# Patient Record
Sex: MALE | Race: WHITE | NOT HISPANIC OR LATINO | Employment: FULL TIME | URBAN - METROPOLITAN AREA
[De-identification: names, ages, dates, MRNs, and addresses within clinical notes are randomized per-mention and may not be internally consistent; named-entity substitution may affect disease eponyms.]

---

## 2018-07-16 ENCOUNTER — OFFICE VISIT (OUTPATIENT)
Dept: URGENT CARE | Facility: CLINIC | Age: 28
End: 2018-07-16
Payer: COMMERCIAL

## 2018-07-16 VITALS
HEART RATE: 81 BPM | DIASTOLIC BLOOD PRESSURE: 86 MMHG | OXYGEN SATURATION: 99 % | RESPIRATION RATE: 18 BRPM | SYSTOLIC BLOOD PRESSURE: 129 MMHG

## 2018-07-16 DIAGNOSIS — S05.01XA ABRASION OF RIGHT CONJUNCTIVA, INITIAL ENCOUNTER: Primary | ICD-10-CM

## 2018-07-16 PROCEDURE — 99203 OFFICE O/P NEW LOW 30 MIN: CPT | Performed by: FAMILY MEDICINE

## 2018-07-16 RX ORDER — OFLOXACIN 3 MG/ML
1 SOLUTION/ DROPS OPHTHALMIC 4 TIMES DAILY
Qty: 5 ML | Refills: 0 | Status: SHIPPED | OUTPATIENT
Start: 2018-07-16 | End: 2019-05-03 | Stop reason: ALTCHOICE

## 2018-07-16 NOTE — PROGRESS NOTES
[facility] - Patient Visit Note  Annette Gomez 24 y o  male MRN: [id number]      Assessment / Plan:   Diagnosis ICD-10-CM Associated Orders   1  Abrasion of right conjunctiva, initial encounter S05  01XA ofloxacin (OCUFLOX) 0 3 % ophthalmic solution       Reason For Visit / Chief Complaint  Chief Complaint   Patient presents with    Eye Pain     Pt c/o right eye pain for an hour    [place] Discussion:  Please see patient instructions     HPI:  Annette Gomez is a 24 y o  male Patient           This Patient Presents with a history of having gotten something in his eye office sure while he had been in the attic all day  This happened about an hour ago he then brought that he now has a sensation of foreign body in the right eye  He has no history of eye trauma in the past he has no blurred vision no history of glaucoma  ALLERGIES:  Allergies as of 07/16/2018    (No Known Allergies)       The following portions of the patient's history were reviewed and updated as appropriate: Allergies, current medications, past family history, past medical history, past social history, past surgical history, and the problem list     Historical Information   No past medical history on file  No past surgical history on file  Social History   History   Alcohol use Not on file     History   Drug use: Unknown     History   Smoking Status    Not on file   Smokeless Tobacco    Not on file     No family history on file            MEDS:    Current Outpatient Prescriptions:     ofloxacin (OCUFLOX) 0 3 % ophthalmic solution, Administer 1 drop to both eyes 4 (four) times a day, Disp: 5 mL, Rfl: 0    FACILITY ADMINISTERED MEDS:        REVIEW OF SYSTEMS    GENERAL: NEGATIVE for:  Generalized Fatigue                             Chills                              Fever Myalgias     OPTHALMIC: NEGATIVE for:  Diplopia                            Scotomata                               ENT:  [address] for:  Hearing Difficulty                            Tinnitus                            Vertigo                            Dizziness                            Ear Pain                            Ear Drainage               NOSE NEGATIVE for:  Nasal Congestion                            Nasal Discharge                            Sinus Pain / Pressure               THROAT NEGATIVE for:  Sore Throat / Throat Pain                            Difficulty Swallowing     RESPIRATORY: NEGATIVE for:  Cough                            Wheezing                            Sputum Production                            Sob / Tachypnea                            Hemoptysis     CARDIOVASCULAR: NEGATIVE for:  Chest Pain                             SOB (cardiac Related)                             Dyspnea on Exertion                             Orthopnea                             PND                             Leg Edema                             Palpitations                               Irregularities/rythym                       CURRENT VITALS:   Blood Pressure: 129/86 (07/16/18 1918)  Pulse: 81 (07/16/18 1918)  Respirations: 18 (07/16/18 1918)  SpO2: 99 % (07/16/18 1918)  /86   Pulse 81   Resp 18   SpO2 99%       PHYSICAL EXAM:         General Appearance:    Alert, cooperative, no apparent distress, appears stated age     Oriented x3    Head:    Normocephalic, without obvious abnormality, atraumatic   Eyes:      EOM's intact,      MAKENZIE,        conjunctiva/corneas clear,          fundi not visualized well    Patient was anesthetized then stained with fluorescein a small abrasion of the conjunctiva is noted at approximately 12 o'clock cornea is not involved   Ears:     Normal external ear canals     Tm right side  Normal     Tm left side    Normal       Nose:   Nares normal externally, septum midline,     mucosa normal,     No anterior drainage         Sinuses   with out   tenderness to palpation / percussion     Throat:   Lips, mucosa, and tongue normal       Anterior pharynx   Normal      Posterior pharynx   Normal        No exudate obvious       Neck:   Supple, symmetrical, trachea midline and moveable    Normal thyroid click present    No carotid bruits appreciated        Lymphatics:     Adenopathy in anterior cervical chain  Normal      Adenopathy in posterior cervical chain   Normal     Lungs:     Clear to auscultation bilaterally    No rales    No ronchi    No wheeze     Heart[de-identified]    Regular rate and rhythm, S1 and S2 normal,     No S3, S4, audible    No murmurs, rubs      Extremities:     Extremities grossly normal     atraumatic,     no cyanosis or edema        Skin:     Skin color, texture, turgor normal, no rashes or lesions                         Follow up at primary care in 2 or 3 days, or sooner if needed OR pesent to local Emergency Room if symptoms are worsening  Portions of the record may have been created with voice recognition software   Occasional wrong word or "sound a like" substitutions may have occurred due to the inherent limitations of voice recognition software   Read the chart carefully and recognize, using context, where substitutions have occurred

## 2018-07-16 NOTE — PATIENT INSTRUCTIONS
Patient was given anesthesia with proparacaine then stained with fluorescein  There is a small abrasion of the conjunctiva at approximately 9 o'clock  Please use the ofloxacin drops  You may use the propeericaine drops if pain develops also

## 2019-05-03 ENCOUNTER — OFFICE VISIT (OUTPATIENT)
Dept: FAMILY MEDICINE CLINIC | Facility: CLINIC | Age: 29
End: 2019-05-03
Payer: COMMERCIAL

## 2019-05-03 ENCOUNTER — APPOINTMENT (OUTPATIENT)
Dept: LAB | Facility: MEDICAL CENTER | Age: 29
End: 2019-05-03
Payer: COMMERCIAL

## 2019-05-03 VITALS
SYSTOLIC BLOOD PRESSURE: 124 MMHG | DIASTOLIC BLOOD PRESSURE: 84 MMHG | BODY MASS INDEX: 29.01 KG/M2 | WEIGHT: 195.89 LBS | HEIGHT: 69 IN

## 2019-05-03 DIAGNOSIS — Z00.00 WELL ADULT EXAM: Primary | ICD-10-CM

## 2019-05-03 DIAGNOSIS — Z00.00 ANNUAL PHYSICAL EXAM: ICD-10-CM

## 2019-05-03 DIAGNOSIS — Z76.89 ENCOUNTER FOR ASSESSMENT OF STD EXPOSURE: ICD-10-CM

## 2019-05-03 DIAGNOSIS — Z13.29 SCREENING FOR THYROID DISORDER: ICD-10-CM

## 2019-05-03 DIAGNOSIS — J30.2 OTHER SEASONAL ALLERGIC RHINITIS: ICD-10-CM

## 2019-05-03 DIAGNOSIS — Z13.220 SCREENING FOR HYPERLIPIDEMIA: ICD-10-CM

## 2019-05-03 DIAGNOSIS — Z00.00 WELL ADULT EXAM: ICD-10-CM

## 2019-05-03 LAB
ALBUMIN SERPL BCP-MCNC: 4.5 G/DL (ref 3.5–5)
ALP SERPL-CCNC: 68 U/L (ref 46–116)
ALT SERPL W P-5'-P-CCNC: 53 U/L (ref 12–78)
ANION GAP SERPL CALCULATED.3IONS-SCNC: 3 MMOL/L (ref 4–13)
AST SERPL W P-5'-P-CCNC: 24 U/L (ref 5–45)
BILIRUB SERPL-MCNC: 0.6 MG/DL (ref 0.2–1)
BUN SERPL-MCNC: 18 MG/DL (ref 5–25)
C TRACH DNA SPEC QL NAA+PROBE: NEGATIVE
CALCIUM SERPL-MCNC: 8.8 MG/DL (ref 8.3–10.1)
CHLORIDE SERPL-SCNC: 106 MMOL/L (ref 100–108)
CHOLEST SERPL-MCNC: 172 MG/DL (ref 50–200)
CO2 SERPL-SCNC: 30 MMOL/L (ref 21–32)
CREAT SERPL-MCNC: 1.27 MG/DL (ref 0.6–1.3)
ERYTHROCYTE [DISTWIDTH] IN BLOOD BY AUTOMATED COUNT: 13.1 % (ref 11.6–15.1)
GFR SERPL CREATININE-BSD FRML MDRD: 77 ML/MIN/1.73SQ M
GLUCOSE P FAST SERPL-MCNC: 84 MG/DL (ref 65–99)
HCT VFR BLD AUTO: 47.9 % (ref 36.5–49.3)
HCV AB SER QL: NORMAL
HDLC SERPL-MCNC: 49 MG/DL (ref 40–60)
HGB BLD-MCNC: 15.6 G/DL (ref 12–17)
LDLC SERPL CALC-MCNC: 109 MG/DL (ref 0–100)
MCH RBC QN AUTO: 30.4 PG (ref 26.8–34.3)
MCHC RBC AUTO-ENTMCNC: 32.6 G/DL (ref 31.4–37.4)
MCV RBC AUTO: 93 FL (ref 82–98)
N GONORRHOEA DNA SPEC QL NAA+PROBE: NEGATIVE
PLATELET # BLD AUTO: 272 THOUSANDS/UL (ref 149–390)
PMV BLD AUTO: 10.3 FL (ref 8.9–12.7)
POTASSIUM SERPL-SCNC: 4.1 MMOL/L (ref 3.5–5.3)
PROT SERPL-MCNC: 7.4 G/DL (ref 6.4–8.2)
RBC # BLD AUTO: 5.13 MILLION/UL (ref 3.88–5.62)
SODIUM SERPL-SCNC: 139 MMOL/L (ref 136–145)
TRIGL SERPL-MCNC: 71 MG/DL
TSH SERPL DL<=0.05 MIU/L-ACNC: 2.88 UIU/ML (ref 0.36–3.74)
WBC # BLD AUTO: 4.72 THOUSAND/UL (ref 4.31–10.16)

## 2019-05-03 PROCEDURE — 86803 HEPATITIS C AB TEST: CPT

## 2019-05-03 PROCEDURE — 80061 LIPID PANEL: CPT

## 2019-05-03 PROCEDURE — 87389 HIV-1 AG W/HIV-1&-2 AB AG IA: CPT

## 2019-05-03 PROCEDURE — 87491 CHLMYD TRACH DNA AMP PROBE: CPT

## 2019-05-03 PROCEDURE — 86696 HERPES SIMPLEX TYPE 2 TEST: CPT

## 2019-05-03 PROCEDURE — 99385 PREV VISIT NEW AGE 18-39: CPT | Performed by: PHYSICIAN ASSISTANT

## 2019-05-03 PROCEDURE — 84443 ASSAY THYROID STIM HORMONE: CPT

## 2019-05-03 PROCEDURE — 86695 HERPES SIMPLEX TYPE 1 TEST: CPT

## 2019-05-03 PROCEDURE — 36415 COLL VENOUS BLD VENIPUNCTURE: CPT

## 2019-05-03 PROCEDURE — 85027 COMPLETE CBC AUTOMATED: CPT

## 2019-05-03 PROCEDURE — 80053 COMPREHEN METABOLIC PANEL: CPT

## 2019-05-03 PROCEDURE — 86592 SYPHILIS TEST NON-TREP QUAL: CPT

## 2019-05-03 PROCEDURE — 87591 N.GONORRHOEAE DNA AMP PROB: CPT

## 2019-05-03 RX ORDER — FLUTICASONE PROPIONATE 50 MCG
2 SPRAY, SUSPENSION (ML) NASAL DAILY
Qty: 1 BOTTLE | Refills: 11 | Status: SHIPPED | OUTPATIENT
Start: 2019-05-03 | End: 2020-08-28

## 2019-05-04 LAB
HSV1 IGG SER IA-ACNC: <0.91 INDEX (ref 0–0.9)
HSV2 IGG SER IA-ACNC: <0.91 INDEX (ref 0–0.9)

## 2019-05-05 LAB — RPR SER QL: NORMAL

## 2019-05-06 LAB — HIV 1+2 AB+HIV1 P24 AG SERPL QL IA: NORMAL

## 2020-02-24 ENCOUNTER — OFFICE VISIT (OUTPATIENT)
Dept: URGENT CARE | Facility: CLINIC | Age: 30
End: 2020-02-24
Payer: COMMERCIAL

## 2020-02-24 VITALS
RESPIRATION RATE: 16 BRPM | OXYGEN SATURATION: 98 % | HEIGHT: 69 IN | DIASTOLIC BLOOD PRESSURE: 93 MMHG | WEIGHT: 196.6 LBS | BODY MASS INDEX: 29.12 KG/M2 | SYSTOLIC BLOOD PRESSURE: 136 MMHG | HEART RATE: 82 BPM | TEMPERATURE: 98.1 F

## 2020-02-24 DIAGNOSIS — S00.31XA ABRASION OF NOSE, INITIAL ENCOUNTER: Primary | ICD-10-CM

## 2020-02-24 PROCEDURE — 99213 OFFICE O/P EST LOW 20 MIN: CPT | Performed by: PHYSICIAN ASSISTANT

## 2020-02-24 PROCEDURE — S9088 SERVICES PROVIDED IN URGENT: HCPCS | Performed by: PHYSICIAN ASSISTANT

## 2020-02-24 NOTE — PATIENT INSTRUCTIONS
Apply Bactroban ointment to the nose as prescribed  If you have increased nose bleeds pain or drainage go to the emergency room or ENT for further evaluation  Nosebleed   AMBULATORY CARE:   A nosebleed , or epistaxis, occurs when one or more of the blood vessels in your nose break  You may have dark or bright red blood from one or both nostrils  A nosebleed is most commonly caused by dry air or picking your nose  A direct blow to your nose, irritation from a cold or allergies, or a foreign object can also cause a nosebleed  Seek care immediately if:   · Your nose is still bleeding after 20 minutes, even after you pinch it  · Your nasal packing is soaked with blood  · You have a foul-smelling discharge coming out of your nose  · You feel so weak and dizzy that you have trouble standing up  · You have trouble breathing or talking  Contact your healthcare provider if:   · You have a fever and are vomiting  · You have pain in and around your nose  · Your nasal pack is loose  · You have questions or concerns about your condition or care  First aid:   · Sit up and lean forward  This will help prevent you from swallowing blood  Spit blood and saliva into a bowl  · Apply pressure to your nose  Use 2 fingers to pinch your nose shut for 10 to 15 minutes  This will help stop the bleeding  Breathe through your mouth  · Apply ice  on the bridge of your nose to decrease swelling and bleeding  Use a cold pack or put crushed ice in a plastic bag  Cover it with a towel to protect your skin  · Pack your nose  with a cotton ball, tissue, tampon, or gauze bandage to stop the bleeding  Treatment for a severe nosebleed  may include any of the following if the bleeding does not stop after first aid is done:  · Medicines  may be applied to a small piece of cotton and placed in your nose  Medicine may also be sprayed in or applied directly to your nose   You may need medicine to prevent an infection  If bleeding is severe, medicine may be injected into a blood vessel in your nose  · Cautery  is when a chemical or electric device is used to seal the blood vessels  This may be done to stop bleeding or prevent more bleeding  Local anesthesia may be used  · Nasal packing  is when layers of gauze are placed in your nose to provide pressure and stop the bleeding  Local anesthesia may be used  Nasal packing is usually removed in 2 to 3 days  · Embolization  is a procedure used to stop the bleeding from inside your nose  Medical glue or a small balloon device may be used to clog the blood vessels in your nose  · Surgery  may be needed if your nosebleed returns over and over long-term  An artery may be tied to stop bleeding  Damaged tissue or an abnormal structure in your nose may be repaired  Prevent another nosebleed:   · Keep your nose moist   Put a small amount of petroleum jelly inside your nostrils as needed  Use a saline (saltwater) nasal spray  Do not put anything else inside your nose unless your healthcare provider says it is okay  Do not  use oil-based lubricants if you use oxygen therapy  They may be flammable  · Use a cool mist humidifier to increase air moisture in your home  This will help your nose stay moist      · Do not pick or blow your nose for at least a week  You can irritate or damage your nose if you pick it  Blowing your nose too hard may cause the bleeding to start again  Do not bend over or strain as this can cause the bleeding to start again  · Avoid irritants  such as tobacco smoke or chemical sprays such as   Follow up with your healthcare provider as directed: Any packing in your nose should be removed within 2 to 3 days  Write down your questions so you remember to ask them during your visits  © 2017 [employer] Information is for End User's use only and may not be sold, redistributed or otherwise used for commercial purposes  All illustrations and images included in CareNotes® are the copyrighted property of A D [employer] M [employer]  or Adrian Gutierrez  The above information is an  only  It is not intended as medical advice for individual conditions or treatments  Talk to your doctor, nurse or pharmacist before following any medical regimen to see if it is safe and effective for you

## 2020-02-24 NOTE — PROGRESS NOTES
[facility]        NAME: Kim Woody is a 34 y o  male  : 1990    MRN: [id number]  DATE: 2020  TIME: 5:36 PM    Assessment and Plan   Abrasion of nose, initial encounter [S00 31XA]  1  Abrasion of nose, initial encounter  mupirocin (BACTROBAN) 2 % ointment         Patient Instructions     Apply Bactroban ointment to the nose as prescribed  If you have increased nose bleeds pain or drainage go to the emergency room or ENT for further evaluation  Follow up with PCP in 3-5 days  Proceed to  ER if symptoms worsen  Chief Complaint     Chief Complaint   Patient presents with    Facial Injury     Pt states one wk ago he was picking up OQO and a splinter went up his nose  Right nostril  c/o occasion bleeding from same nostril  History of Present Illness       Patient was moving OQO a week ago when a small 20 go on a long went into his right nostril  Patient states he was able to remove a splinter  He denies any pain but does have intermittent bleeding from his right nares  Patient states that the bleeding is very minimal       Review of Systems   Review of Systems   Constitutional: Negative for chills and fever  HENT: Positive for nosebleeds  Negative for postnasal drip and rhinorrhea  Gastrointestinal: Negative for nausea and vomiting  Musculoskeletal: Negative for myalgias  Neurological: Negative for headaches  Hematological: Negative for adenopathy           Current Medications       Current Outpatient Medications:     fluticasone (FLONASE) 50 mcg/act nasal spray, 2 sprays into each nostril daily, Disp: 1 Bottle, Rfl: 11    mupirocin (BACTROBAN) 2 % ointment, Apply topically 3 (three) times a day, Disp: 22 g, Rfl: 0    Current Allergies     Allergies as of 2020    (No Known Allergies)            The following portions of the patient's history were reviewed and updated as appropriate: allergies, current medications, past family history, past medical history, past social history, past surgical history and problem list      History reviewed  No pertinent past medical history  History reviewed  No pertinent surgical history  Family History   Problem Relation Age of Onset    Heart disease Father     Diabetes Father          Medications have been verified  Objective   /93 (BP Location: Left arm, Patient Position: Sitting, Cuff Size: Adult)   Pulse 82   Temp 98 1 °F (36 7 °C) (Tympanic)   Resp 16   Ht 5' 9" (1 753 m)   Wt 89 2 kg (196 lb 9 6 oz)   SpO2 98%   BMI 29 03 kg/m²        Physical Exam     Physical Exam   Constitutional: He is oriented to person, place, and time  He appears well-developed and well-nourished  HENT:   Head: Normocephalic and atraumatic  Small abrasion with surrounding dry blood on the medial aspect of the right nares  No visible foreign bodies  Neck: Normal range of motion  Cardiovascular: Normal rate  Pulmonary/Chest: Effort normal    Neurological: He is alert and oriented to person, place, and time  Skin: Skin is warm and dry  Psychiatric: He has a normal mood and affect  His behavior is normal    Nursing note and vitals reviewed

## 2020-08-28 ENCOUNTER — APPOINTMENT (OUTPATIENT)
Dept: LAB | Facility: MEDICAL CENTER | Age: 30
End: 2020-08-28
Payer: COMMERCIAL

## 2020-08-28 ENCOUNTER — OFFICE VISIT (OUTPATIENT)
Dept: FAMILY MEDICINE CLINIC | Facility: CLINIC | Age: 30
End: 2020-08-28
Payer: COMMERCIAL

## 2020-08-28 VITALS
SYSTOLIC BLOOD PRESSURE: 120 MMHG | WEIGHT: 190 LBS | DIASTOLIC BLOOD PRESSURE: 74 MMHG | TEMPERATURE: 97.2 F | HEIGHT: 69 IN | HEART RATE: 72 BPM | OXYGEN SATURATION: 97 % | BODY MASS INDEX: 28.14 KG/M2

## 2020-08-28 DIAGNOSIS — Z13.9 SCREENING FOR CONDITION: ICD-10-CM

## 2020-08-28 DIAGNOSIS — Z13.220 SCREENING FOR HYPERLIPIDEMIA: ICD-10-CM

## 2020-08-28 DIAGNOSIS — Z00.00 WELL ADULT EXAM: Primary | ICD-10-CM

## 2020-08-28 DIAGNOSIS — Z00.00 ANNUAL PHYSICAL EXAM: ICD-10-CM

## 2020-08-28 DIAGNOSIS — Z23 NEED FOR INFLUENZA VACCINATION: ICD-10-CM

## 2020-08-28 LAB
ALBUMIN SERPL BCP-MCNC: 4.3 G/DL (ref 3.5–5)
ALP SERPL-CCNC: 73 U/L (ref 46–116)
ALT SERPL W P-5'-P-CCNC: 67 U/L (ref 12–78)
ANION GAP SERPL CALCULATED.3IONS-SCNC: 3 MMOL/L (ref 4–13)
AST SERPL W P-5'-P-CCNC: 33 U/L (ref 5–45)
BILIRUB SERPL-MCNC: 0.54 MG/DL (ref 0.2–1)
BUN SERPL-MCNC: 20 MG/DL (ref 5–25)
CALCIUM SERPL-MCNC: 9.4 MG/DL (ref 8.3–10.1)
CHLORIDE SERPL-SCNC: 108 MMOL/L (ref 100–108)
CHOLEST SERPL-MCNC: 194 MG/DL (ref 50–200)
CO2 SERPL-SCNC: 30 MMOL/L (ref 21–32)
CREAT SERPL-MCNC: 1.12 MG/DL (ref 0.6–1.3)
ERYTHROCYTE [DISTWIDTH] IN BLOOD BY AUTOMATED COUNT: 12.9 % (ref 11.6–15.1)
GFR SERPL CREATININE-BSD FRML MDRD: 90 ML/MIN/1.73SQ M
GLUCOSE P FAST SERPL-MCNC: 93 MG/DL (ref 65–99)
HCT VFR BLD AUTO: 47.9 % (ref 36.5–49.3)
HDLC SERPL-MCNC: 46 MG/DL
HGB BLD-MCNC: 15.6 G/DL (ref 12–17)
LDLC SERPL CALC-MCNC: 136 MG/DL (ref 0–100)
MCH RBC QN AUTO: 30.1 PG (ref 26.8–34.3)
MCHC RBC AUTO-ENTMCNC: 32.6 G/DL (ref 31.4–37.4)
MCV RBC AUTO: 92 FL (ref 82–98)
PLATELET # BLD AUTO: 279 THOUSANDS/UL (ref 149–390)
PMV BLD AUTO: 9.7 FL (ref 8.9–12.7)
POTASSIUM SERPL-SCNC: 4.2 MMOL/L (ref 3.5–5.3)
PROT SERPL-MCNC: 7.8 G/DL (ref 6.4–8.2)
RBC # BLD AUTO: 5.19 MILLION/UL (ref 3.88–5.62)
SODIUM SERPL-SCNC: 141 MMOL/L (ref 136–145)
TRIGL SERPL-MCNC: 62 MG/DL
WBC # BLD AUTO: 5.58 THOUSAND/UL (ref 4.31–10.16)

## 2020-08-28 PROCEDURE — 90686 IIV4 VACC NO PRSV 0.5 ML IM: CPT | Performed by: PHYSICIAN ASSISTANT

## 2020-08-28 PROCEDURE — 80061 LIPID PANEL: CPT

## 2020-08-28 PROCEDURE — 3725F SCREEN DEPRESSION PERFORMED: CPT | Performed by: PHYSICIAN ASSISTANT

## 2020-08-28 PROCEDURE — 90471 IMMUNIZATION ADMIN: CPT | Performed by: PHYSICIAN ASSISTANT

## 2020-08-28 PROCEDURE — 85027 COMPLETE CBC AUTOMATED: CPT

## 2020-08-28 PROCEDURE — 99395 PREV VISIT EST AGE 18-39: CPT | Performed by: PHYSICIAN ASSISTANT

## 2020-08-28 PROCEDURE — 36415 COLL VENOUS BLD VENIPUNCTURE: CPT

## 2020-08-28 PROCEDURE — 80053 COMPREHEN METABOLIC PANEL: CPT

## 2020-08-28 PROCEDURE — 1036F TOBACCO NON-USER: CPT | Performed by: PHYSICIAN ASSISTANT

## 2020-08-28 PROCEDURE — 3008F BODY MASS INDEX DOCD: CPT | Performed by: PHYSICIAN ASSISTANT

## 2020-08-28 NOTE — PATIENT INSTRUCTIONS

## 2020-08-28 NOTE — PROGRESS NOTES
[facility] [address] [facility]    NAME: Ny Zepeda  AGE: 34 y o  SEX: male  : 1990     DATE: 2020     Assessment and Plan:     Problem List Items Addressed This Visit     None      Visit Diagnoses     Well adult exam    -  Primary    Need for influenza vaccination        Relevant Orders    influenza vaccine, quadrivalent, 0 5 mL, preservative-free, for adult and pediatric patients 6 mos+ (AFLURIA, FLUARIX, FLULAVAL, FLUZONE) (Completed)    Screening for hyperlipidemia        Relevant Orders    Lipid Panel with Direct LDL reflex    Screening for condition        Relevant Orders    CBC    Comprehensive metabolic panel    Annual physical exam        BMI 28 0-28 9,adult              Immunizations and preventive care screenings were discussed with patient today  Appropriate education was printed on patient's after visit summary  Counseling:  · Dental Health: discussed importance of regular tooth brushing, flossing, and dental visits  No follow-ups on file  Chief Complaint:     Chief Complaint   Patient presents with    Annual Exam     well visit       History of Present Illness:     Adult Annual Physical   Patient here for a comprehensive physical exam  The patient reports no problems  Diet and Physical Activity  · Diet/Nutrition: well balanced diet  · Exercise: no formal exercise  Depression Screening  PHQ-9 Depression Screening    PHQ-9:    Frequency of the following problems over the past two weeks:       Little interest or pleasure in doing things:  0 - not at all  Feeling down, depressed, or hopeless:  0 - not at all  PHQ-2 Score:  0       General Health  · Sleep: sleeps well  · Hearing: normal - bilateral   · Vision: no vision problems  · Dental: regular dental visits  Review of Systems:     Review of Systems   Past Medical History:     No past medical history on file     Past Surgical History: No past surgical history on file  Social History:        Social History     Socioeconomic History    Marital status: Single     Spouse name: None    Number of children: None    Years of education: None    Highest education level: None   Occupational History    None   Social Needs    Financial resource strain: None    Food insecurity     Worry: None     Inability: None    Transportation needs     Medical: None     Non-medical: None   Tobacco Use    Smoking status: Never Smoker    Smokeless tobacco: Never Used   Substance and Sexual Activity    Alcohol use: Yes     Frequency: 2-4 times a month    Drug use: Not Currently    Sexual activity: None   Lifestyle    Physical activity     Days per week: None     Minutes per session: None    Stress: None   Relationships    Social connections     Talks on phone: None     Gets together: None     Attends Scientologist service: None     Active member of club or organization: None     Attends meetings of clubs or organizations: None     Relationship status: None    Intimate partner violence     Fear of current or ex partner: None     Emotionally abused: None     Physically abused: None     Forced sexual activity: None   Other Topics Concern    None   Social History Narrative    None      Family History:     Family History   Problem Relation Age of Onset    Heart disease Father     Diabetes Father       Current Medications:     No current outpatient medications on file  No current facility-administered medications for this visit  Allergies:     No Known Allergies   Physical Exam:     /74   Pulse 72   Temp (!) 97 2 °F (36 2 °C)   Ht 5' 9" (1 753 m)   Wt 86 2 kg (190 lb)   SpO2 97%   BMI 28 06 kg/m²     Physical Exam     Faustino Ronquillo PA-C   [facility]       BMI Counseling: Body mass index is 28 06 kg/m²   The BMI is above normal  Nutrition recommendations include reducing portion sizes, decreasing overall calorie intake and 3-5 servings of fruits/vegetables daily  Exercise recommendations include exercising 3-5 times per week

## 2021-04-05 DIAGNOSIS — Z23 ENCOUNTER FOR IMMUNIZATION: ICD-10-CM

## 2021-04-12 ENCOUNTER — IMMUNIZATIONS (OUTPATIENT)
Dept: FAMILY MEDICINE CLINIC | Facility: HOSPITAL | Age: 31
End: 2021-04-12

## 2021-04-12 DIAGNOSIS — Z23 ENCOUNTER FOR IMMUNIZATION: Primary | ICD-10-CM

## 2021-04-12 PROCEDURE — 0011A SARS-COV-2 / COVID-19 MRNA VACCINE (MODERNA) 100 MCG: CPT

## 2021-04-12 PROCEDURE — 91301 SARS-COV-2 / COVID-19 MRNA VACCINE (MODERNA) 100 MCG: CPT

## 2021-05-11 ENCOUNTER — IMMUNIZATIONS (OUTPATIENT)
Dept: FAMILY MEDICINE CLINIC | Facility: HOSPITAL | Age: 31
End: 2021-05-11

## 2021-05-11 DIAGNOSIS — Z23 ENCOUNTER FOR IMMUNIZATION: Primary | ICD-10-CM

## 2021-05-11 PROCEDURE — 0012A SARS-COV-2 / COVID-19 MRNA VACCINE (MODERNA) 100 MCG: CPT

## 2021-05-11 PROCEDURE — 91301 SARS-COV-2 / COVID-19 MRNA VACCINE (MODERNA) 100 MCG: CPT

## 2021-07-27 ENCOUNTER — CLINICAL SUPPORT (OUTPATIENT)
Dept: FAMILY MEDICINE CLINIC | Facility: CLINIC | Age: 31
End: 2021-07-27

## 2021-07-27 DIAGNOSIS — Z51.6 DESENSITIZATION TO ALLERGENS: Primary | ICD-10-CM

## 2021-08-03 ENCOUNTER — CLINICAL SUPPORT (OUTPATIENT)
Dept: FAMILY MEDICINE CLINIC | Facility: CLINIC | Age: 31
End: 2021-08-03
Payer: COMMERCIAL

## 2021-08-03 DIAGNOSIS — Z51.6 DESENSITIZATION TO ALLERGENS: Primary | ICD-10-CM

## 2021-08-03 PROCEDURE — 95115 IMMUNOTHERAPY ONE INJECTION: CPT | Performed by: PHYSICIAN ASSISTANT

## 2021-08-10 ENCOUNTER — CLINICAL SUPPORT (OUTPATIENT)
Dept: FAMILY MEDICINE CLINIC | Facility: CLINIC | Age: 31
End: 2021-08-10
Payer: COMMERCIAL

## 2021-08-10 DIAGNOSIS — Z51.6 ALLERGY DESENSITIZATION THERAPY: ICD-10-CM

## 2021-08-10 PROCEDURE — 95115 IMMUNOTHERAPY ONE INJECTION: CPT | Performed by: PHYSICIAN ASSISTANT

## 2021-08-18 ENCOUNTER — CLINICAL SUPPORT (OUTPATIENT)
Dept: FAMILY MEDICINE CLINIC | Facility: CLINIC | Age: 31
End: 2021-08-18
Payer: COMMERCIAL

## 2021-08-18 DIAGNOSIS — Z51.6 ALLERGY DESENSITIZATION THERAPY: Primary | ICD-10-CM

## 2021-08-18 PROCEDURE — 95115 IMMUNOTHERAPY ONE INJECTION: CPT | Performed by: FAMILY MEDICINE

## 2021-08-25 ENCOUNTER — CLINICAL SUPPORT (OUTPATIENT)
Dept: FAMILY MEDICINE CLINIC | Facility: CLINIC | Age: 31
End: 2021-08-25
Payer: COMMERCIAL

## 2021-08-25 DIAGNOSIS — Z29.8 IMMUNOTHERAPY ENCOUNTER: ICD-10-CM

## 2021-08-25 PROCEDURE — 95115 IMMUNOTHERAPY ONE INJECTION: CPT | Performed by: PHYSICIAN ASSISTANT

## 2021-09-02 ENCOUNTER — CLINICAL SUPPORT (OUTPATIENT)
Dept: FAMILY MEDICINE CLINIC | Facility: CLINIC | Age: 31
End: 2021-09-02
Payer: COMMERCIAL

## 2021-09-02 DIAGNOSIS — Z51.6 DESENSITIZATION TO ALLERGENS: ICD-10-CM

## 2021-09-02 PROCEDURE — 95115 IMMUNOTHERAPY ONE INJECTION: CPT | Performed by: PHYSICIAN ASSISTANT

## 2021-09-08 ENCOUNTER — CLINICAL SUPPORT (OUTPATIENT)
Dept: FAMILY MEDICINE CLINIC | Facility: CLINIC | Age: 31
End: 2021-09-08
Payer: COMMERCIAL

## 2021-09-08 DIAGNOSIS — Z51.6 ALLERGY DESENSITIZATION THERAPY: ICD-10-CM

## 2021-09-08 PROCEDURE — 95115 IMMUNOTHERAPY ONE INJECTION: CPT | Performed by: PHYSICIAN ASSISTANT

## 2021-09-15 ENCOUNTER — CLINICAL SUPPORT (OUTPATIENT)
Dept: FAMILY MEDICINE CLINIC | Facility: CLINIC | Age: 31
End: 2021-09-15
Payer: COMMERCIAL

## 2021-09-15 DIAGNOSIS — J30.2 OTHER SEASONAL ALLERGIC RHINITIS: ICD-10-CM

## 2021-09-15 PROCEDURE — 95115 IMMUNOTHERAPY ONE INJECTION: CPT | Performed by: PHYSICIAN ASSISTANT

## 2021-09-22 ENCOUNTER — CLINICAL SUPPORT (OUTPATIENT)
Dept: FAMILY MEDICINE CLINIC | Facility: CLINIC | Age: 31
End: 2021-09-22
Payer: COMMERCIAL

## 2021-09-22 DIAGNOSIS — Z51.6 DESENSITIZATION TO ALLERGENS: Primary | ICD-10-CM

## 2021-09-22 PROCEDURE — 95115 IMMUNOTHERAPY ONE INJECTION: CPT | Performed by: PHYSICIAN ASSISTANT

## 2021-09-29 ENCOUNTER — CLINICAL SUPPORT (OUTPATIENT)
Dept: FAMILY MEDICINE CLINIC | Facility: CLINIC | Age: 31
End: 2021-09-29
Payer: COMMERCIAL

## 2021-09-29 DIAGNOSIS — J30.2 OTHER SEASONAL ALLERGIC RHINITIS: ICD-10-CM

## 2021-09-29 PROCEDURE — 95115 IMMUNOTHERAPY ONE INJECTION: CPT | Performed by: PHYSICIAN ASSISTANT

## 2021-10-06 ENCOUNTER — CLINICAL SUPPORT (OUTPATIENT)
Dept: FAMILY MEDICINE CLINIC | Facility: CLINIC | Age: 31
End: 2021-10-06
Payer: COMMERCIAL

## 2021-10-06 DIAGNOSIS — Z51.6 DESENSITIZATION TO ALLERGENS: Primary | ICD-10-CM

## 2021-10-06 PROCEDURE — 95115 IMMUNOTHERAPY ONE INJECTION: CPT | Performed by: PHYSICIAN ASSISTANT

## 2021-10-13 ENCOUNTER — CLINICAL SUPPORT (OUTPATIENT)
Dept: FAMILY MEDICINE CLINIC | Facility: CLINIC | Age: 31
End: 2021-10-13
Payer: COMMERCIAL

## 2021-10-13 DIAGNOSIS — Z51.6 ALLERGY DESENSITIZATION THERAPY: Primary | ICD-10-CM

## 2021-10-13 PROCEDURE — 95115 IMMUNOTHERAPY ONE INJECTION: CPT | Performed by: INTERNAL MEDICINE

## 2021-10-20 ENCOUNTER — APPOINTMENT (OUTPATIENT)
Dept: LAB | Facility: MEDICAL CENTER | Age: 31
End: 2021-10-20
Payer: COMMERCIAL

## 2021-10-20 ENCOUNTER — OFFICE VISIT (OUTPATIENT)
Dept: FAMILY MEDICINE CLINIC | Facility: CLINIC | Age: 31
End: 2021-10-20
Payer: COMMERCIAL

## 2021-10-20 VITALS
DIASTOLIC BLOOD PRESSURE: 82 MMHG | WEIGHT: 201.8 LBS | TEMPERATURE: 97.3 F | HEIGHT: 69 IN | HEART RATE: 87 BPM | SYSTOLIC BLOOD PRESSURE: 114 MMHG | BODY MASS INDEX: 29.89 KG/M2 | OXYGEN SATURATION: 98 %

## 2021-10-20 DIAGNOSIS — Z13.89 SCREENING FOR MULTIPLE CONDITIONS: ICD-10-CM

## 2021-10-20 DIAGNOSIS — M77.8 RIGHT WRIST TENDONITIS: Primary | ICD-10-CM

## 2021-10-20 LAB
ALBUMIN SERPL BCP-MCNC: 4.1 G/DL (ref 3.5–5)
ALP SERPL-CCNC: 64 U/L (ref 46–116)
ALT SERPL W P-5'-P-CCNC: 51 U/L (ref 12–78)
ANION GAP SERPL CALCULATED.3IONS-SCNC: 3 MMOL/L (ref 4–13)
AST SERPL W P-5'-P-CCNC: 29 U/L (ref 5–45)
BILIRUB SERPL-MCNC: 0.81 MG/DL (ref 0.2–1)
BUN SERPL-MCNC: 17 MG/DL (ref 5–25)
CALCIUM SERPL-MCNC: 9.4 MG/DL (ref 8.3–10.1)
CHLORIDE SERPL-SCNC: 107 MMOL/L (ref 100–108)
CHOLEST SERPL-MCNC: 202 MG/DL (ref 50–200)
CO2 SERPL-SCNC: 28 MMOL/L (ref 21–32)
CREAT SERPL-MCNC: 1.15 MG/DL (ref 0.6–1.3)
ERYTHROCYTE [DISTWIDTH] IN BLOOD BY AUTOMATED COUNT: 12.9 % (ref 11.6–15.1)
GFR SERPL CREATININE-BSD FRML MDRD: 86 ML/MIN/1.73SQ M
GLUCOSE P FAST SERPL-MCNC: 93 MG/DL (ref 65–99)
HCT VFR BLD AUTO: 49.4 % (ref 36.5–49.3)
HDLC SERPL-MCNC: 46 MG/DL
HGB BLD-MCNC: 16.3 G/DL (ref 12–17)
LDLC SERPL CALC-MCNC: 125 MG/DL (ref 0–100)
MCH RBC QN AUTO: 30.3 PG (ref 26.8–34.3)
MCHC RBC AUTO-ENTMCNC: 33 G/DL (ref 31.4–37.4)
MCV RBC AUTO: 92 FL (ref 82–98)
PLATELET # BLD AUTO: 270 THOUSANDS/UL (ref 149–390)
PMV BLD AUTO: 9.9 FL (ref 8.9–12.7)
POTASSIUM SERPL-SCNC: 3.9 MMOL/L (ref 3.5–5.3)
PROT SERPL-MCNC: 7.6 G/DL (ref 6.4–8.2)
RBC # BLD AUTO: 5.38 MILLION/UL (ref 3.88–5.62)
SODIUM SERPL-SCNC: 138 MMOL/L (ref 136–145)
TRIGL SERPL-MCNC: 154 MG/DL
WBC # BLD AUTO: 5.32 THOUSAND/UL (ref 4.31–10.16)

## 2021-10-20 PROCEDURE — 99213 OFFICE O/P EST LOW 20 MIN: CPT | Performed by: PHYSICIAN ASSISTANT

## 2021-10-20 PROCEDURE — 3725F SCREEN DEPRESSION PERFORMED: CPT | Performed by: PHYSICIAN ASSISTANT

## 2021-10-20 PROCEDURE — 1036F TOBACCO NON-USER: CPT | Performed by: PHYSICIAN ASSISTANT

## 2021-10-20 PROCEDURE — 80053 COMPREHEN METABOLIC PANEL: CPT

## 2021-10-20 PROCEDURE — 80061 LIPID PANEL: CPT

## 2021-10-20 PROCEDURE — 3008F BODY MASS INDEX DOCD: CPT | Performed by: PHYSICIAN ASSISTANT

## 2021-10-20 PROCEDURE — 36415 COLL VENOUS BLD VENIPUNCTURE: CPT

## 2021-10-20 PROCEDURE — 85027 COMPLETE CBC AUTOMATED: CPT

## 2021-11-22 ENCOUNTER — CLINICAL SUPPORT (OUTPATIENT)
Dept: FAMILY MEDICINE CLINIC | Facility: CLINIC | Age: 31
End: 2021-11-22
Payer: COMMERCIAL

## 2021-11-22 DIAGNOSIS — Z51.6 NEED FOR DESENSITIZATION TO ALLERGENS: ICD-10-CM

## 2021-11-22 PROCEDURE — 95115 IMMUNOTHERAPY ONE INJECTION: CPT | Performed by: PHYSICIAN ASSISTANT

## 2021-11-30 ENCOUNTER — CLINICAL SUPPORT (OUTPATIENT)
Dept: FAMILY MEDICINE CLINIC | Facility: CLINIC | Age: 31
End: 2021-11-30
Payer: COMMERCIAL

## 2021-11-30 DIAGNOSIS — Z51.6 NEED FOR DESENSITIZATION TO ALLERGENS: Primary | ICD-10-CM

## 2021-11-30 PROCEDURE — 95115 IMMUNOTHERAPY ONE INJECTION: CPT | Performed by: FAMILY MEDICINE

## 2021-12-07 ENCOUNTER — CLINICAL SUPPORT (OUTPATIENT)
Dept: FAMILY MEDICINE CLINIC | Facility: CLINIC | Age: 31
End: 2021-12-07
Payer: COMMERCIAL

## 2021-12-07 DIAGNOSIS — Z51.6 NEED FOR DESENSITIZATION TO ALLERGENS: Primary | ICD-10-CM

## 2021-12-07 PROCEDURE — 95115 IMMUNOTHERAPY ONE INJECTION: CPT | Performed by: FAMILY MEDICINE

## 2021-12-14 ENCOUNTER — CLINICAL SUPPORT (OUTPATIENT)
Dept: FAMILY MEDICINE CLINIC | Facility: CLINIC | Age: 31
End: 2021-12-14
Payer: COMMERCIAL

## 2021-12-14 DIAGNOSIS — Z51.6 NEED FOR DESENSITIZATION TO ALLERGENS: Primary | ICD-10-CM

## 2021-12-14 PROCEDURE — 95115 IMMUNOTHERAPY ONE INJECTION: CPT | Performed by: PHYSICIAN ASSISTANT

## 2021-12-21 ENCOUNTER — CLINICAL SUPPORT (OUTPATIENT)
Dept: FAMILY MEDICINE CLINIC | Facility: CLINIC | Age: 31
End: 2021-12-21
Payer: COMMERCIAL

## 2021-12-21 DIAGNOSIS — Z51.6 NEED FOR DESENSITIZATION TO ALLERGENS: Primary | ICD-10-CM

## 2021-12-21 PROCEDURE — 95115 IMMUNOTHERAPY ONE INJECTION: CPT | Performed by: PHYSICIAN ASSISTANT

## 2021-12-28 ENCOUNTER — CLINICAL SUPPORT (OUTPATIENT)
Dept: FAMILY MEDICINE CLINIC | Facility: CLINIC | Age: 31
End: 2021-12-28
Payer: COMMERCIAL

## 2021-12-28 DIAGNOSIS — Z51.6 NEED FOR DESENSITIZATION TO ALLERGENS: Primary | ICD-10-CM

## 2021-12-28 PROCEDURE — 95115 IMMUNOTHERAPY ONE INJECTION: CPT | Performed by: FAMILY MEDICINE

## 2022-01-12 ENCOUNTER — CLINICAL SUPPORT (OUTPATIENT)
Dept: FAMILY MEDICINE CLINIC | Facility: CLINIC | Age: 32
End: 2022-01-12
Payer: COMMERCIAL

## 2022-01-12 DIAGNOSIS — Z29.8 IMMUNOTHERAPY ENCOUNTER: ICD-10-CM

## 2022-01-12 PROCEDURE — 95115 IMMUNOTHERAPY ONE INJECTION: CPT | Performed by: PHYSICIAN ASSISTANT

## 2022-01-18 ENCOUNTER — CLINICAL SUPPORT (OUTPATIENT)
Dept: FAMILY MEDICINE CLINIC | Facility: CLINIC | Age: 32
End: 2022-01-18
Payer: COMMERCIAL

## 2022-01-18 DIAGNOSIS — Z51.6 DESENSITIZATION TO ALLERGENS: Primary | ICD-10-CM

## 2022-01-18 PROCEDURE — 95115 IMMUNOTHERAPY ONE INJECTION: CPT | Performed by: PHYSICIAN ASSISTANT

## 2022-01-25 ENCOUNTER — CLINICAL SUPPORT (OUTPATIENT)
Dept: FAMILY MEDICINE CLINIC | Facility: CLINIC | Age: 32
End: 2022-01-25
Payer: COMMERCIAL

## 2022-01-25 DIAGNOSIS — Z51.6 NEED FOR DESENSITIZATION TO ALLERGENS: ICD-10-CM

## 2022-01-25 PROCEDURE — 95115 IMMUNOTHERAPY ONE INJECTION: CPT | Performed by: PHYSICIAN ASSISTANT

## 2022-02-03 ENCOUNTER — CLINICAL SUPPORT (OUTPATIENT)
Dept: FAMILY MEDICINE CLINIC | Facility: CLINIC | Age: 32
End: 2022-02-03
Payer: COMMERCIAL

## 2022-02-03 DIAGNOSIS — Z29.8 IMMUNOTHERAPY ENCOUNTER: ICD-10-CM

## 2022-02-03 PROCEDURE — 95115 IMMUNOTHERAPY ONE INJECTION: CPT | Performed by: PHYSICIAN ASSISTANT

## 2022-02-17 ENCOUNTER — CLINICAL SUPPORT (OUTPATIENT)
Dept: FAMILY MEDICINE CLINIC | Facility: CLINIC | Age: 32
End: 2022-02-17
Payer: COMMERCIAL

## 2022-02-17 DIAGNOSIS — Z51.6 NEED FOR DESENSITIZATION TO ALLERGENS: ICD-10-CM

## 2022-02-17 PROCEDURE — 95115 IMMUNOTHERAPY ONE INJECTION: CPT | Performed by: PHYSICIAN ASSISTANT

## 2022-03-02 ENCOUNTER — CLINICAL SUPPORT (OUTPATIENT)
Dept: FAMILY MEDICINE CLINIC | Facility: CLINIC | Age: 32
End: 2022-03-02
Payer: COMMERCIAL

## 2022-03-02 DIAGNOSIS — J30.89 OTHER ALLERGIC RHINITIS: ICD-10-CM

## 2022-03-02 PROCEDURE — 95115 IMMUNOTHERAPY ONE INJECTION: CPT | Performed by: PHYSICIAN ASSISTANT

## 2022-04-26 ENCOUNTER — OFFICE VISIT (OUTPATIENT)
Dept: FAMILY MEDICINE CLINIC | Facility: CLINIC | Age: 32
End: 2022-04-26
Payer: COMMERCIAL

## 2022-04-26 VITALS
DIASTOLIC BLOOD PRESSURE: 82 MMHG | TEMPERATURE: 97.9 F | BODY MASS INDEX: 30.39 KG/M2 | HEIGHT: 69 IN | SYSTOLIC BLOOD PRESSURE: 124 MMHG | WEIGHT: 205.2 LBS | OXYGEN SATURATION: 97 % | HEART RATE: 88 BPM

## 2022-04-26 DIAGNOSIS — L25.5 RHUS DERMATITIS: Primary | ICD-10-CM

## 2022-04-26 PROCEDURE — 99214 OFFICE O/P EST MOD 30 MIN: CPT | Performed by: PHYSICIAN ASSISTANT

## 2022-04-26 PROCEDURE — 96372 THER/PROPH/DIAG INJ SC/IM: CPT | Performed by: PHYSICIAN ASSISTANT

## 2022-04-26 RX ORDER — TRIAMCINOLONE ACETONIDE 40 MG/ML
80 INJECTION, SUSPENSION INTRA-ARTICULAR; INTRAMUSCULAR ONCE
Status: COMPLETED | OUTPATIENT
Start: 2022-04-26 | End: 2022-04-26

## 2022-04-26 RX ORDER — PREDNISONE 10 MG/1
TABLET ORAL
Qty: 20 TABLET | Refills: 0 | Status: SHIPPED | OUTPATIENT
Start: 2022-04-26

## 2022-04-26 RX ADMIN — TRIAMCINOLONE ACETONIDE 80 MG: 40 INJECTION, SUSPENSION INTRA-ARTICULAR; INTRAMUSCULAR at 13:30

## 2022-04-26 NOTE — PROGRESS NOTES
Assessment/Plan:    Problem List Items Addressed This Visit     None      Visit Diagnoses     Rhus dermatitis    -  Primary    Relevant Medications    predniSONE 10 mg tablet    triamcinolone acetonide (KENALOG-40) 40 mg/mL injection 80 mg (Start on 4/26/2022  1:30 PM)           Diagnoses and all orders for this visit:    Rhus dermatitis  -     predniSONE 10 mg tablet; Days 1 and 2 take 4 tablets daily, days 3 and 4 take 3 tablets daily, days 5 and 6 and 2 tablets daily, days 7 and 8 take 1 tablet daily  -     triamcinolone acetonide (KENALOG-40) 40 mg/mL injection 80 mg        No problem-specific Assessment & Plan notes found for this encounter  Subjective:      Patient ID: Soraida Quinonez is a 36 y o  male  [facility] is here today after developing poison ivy rash on L face, neck, and genitals yesterday  Rash is worse today  The following portions of the patient's history were reviewed and updated as appropriate:   He has a past medical history of Allergic rhinitis  ,  does not have any pertinent problems on file  ,   has no past surgical history on file  ,  family history includes Diabetes in his father; Heart disease in his father  ,   reports that he has never smoked  He has never used smokeless tobacco  He reports current alcohol use  He reports previous drug use ,  has No Known Allergies     Current Outpatient Medications   Medication Sig Dispense Refill    fluticasone (FLONASE) 50 mcg/act nasal spray 2 sprays into each nostril daily 1 Bottle 10    predniSONE 10 mg tablet Days 1 and 2 take 4 tablets daily, days 3 and 4 take 3 tablets daily, days 5 and 6 and 2 tablets daily, days 7 and 8 take 1 tablet daily   20 tablet 0     Current Facility-Administered Medications   Medication Dose Route Frequency Provider Last Rate Last Admin    triamcinolone acetonide (KENALOG-40) 40 mg/mL injection 80 mg  80 mg Intramuscular Once Rosa Cuba PA-C           Review of Systems   Constitutional: Negative for activity change, appetite change, chills, diaphoresis, fatigue, fever and unexpected weight change  HENT: Negative for congestion, ear pain, postnasal drip, rhinorrhea, sinus pressure, sinus pain, sneezing, sore throat, tinnitus and voice change  Eyes: Negative for pain, redness and visual disturbance  Respiratory: Negative for cough, chest tightness, shortness of breath and wheezing  Cardiovascular: Negative for chest pain, palpitations and leg swelling  Gastrointestinal: Negative for abdominal pain, blood in stool, constipation, diarrhea, nausea and vomiting  Genitourinary: Negative for difficulty urinating, dysuria, frequency, hematuria and urgency  Musculoskeletal: Negative for arthralgias, back pain, gait problem, joint swelling, myalgias, neck pain and neck stiffness  Skin: Positive for rash  Negative for color change, pallor and wound  Neurological: Negative for dizziness, tremors, weakness, light-headedness and headaches  Psychiatric/Behavioral: Negative for dysphoric mood, self-injury, sleep disturbance and suicidal ideas  The patient is not nervous/anxious  Objective:  Vitals:    04/26/22 1320   BP: 124/82   Pulse: 88   Temp: 97 9 °F (36 6 °C)   SpO2: 97%   Weight: 93 1 kg (205 lb 3 2 oz)   Height: 5' 9" (1 753 m)     Body mass index is 30 3 kg/m²  Physical Exam  Vitals reviewed  Constitutional:       General: He is not in acute distress  Appearance: He is well-developed  He is not diaphoretic  HENT:      Head: Normocephalic and atraumatic  Right Ear: Hearing, tympanic membrane, ear canal and external ear normal       Left Ear: Hearing, tympanic membrane, ear canal and external ear normal       Mouth/Throat:      Pharynx: Uvula midline  No oropharyngeal exudate  Eyes:      General: No scleral icterus  Right eye: No discharge  Left eye: No discharge  Conjunctiva/sclera: Conjunctivae normal    Neck:      Thyroid: No thyromegaly  Vascular: No carotid bruit  Cardiovascular:      Rate and Rhythm: Normal rate and regular rhythm  Heart sounds: Normal heart sounds  No murmur heard  Pulmonary:      Effort: Pulmonary effort is normal  No respiratory distress  Breath sounds: Normal breath sounds  No wheezing  Abdominal:      General: Bowel sounds are normal  There is no distension  Palpations: Abdomen is soft  There is no mass  Tenderness: There is no abdominal tenderness  There is no guarding or rebound  Musculoskeletal:         General: No tenderness  Normal range of motion  Cervical back: Neck supple  Lymphadenopathy:      Cervical: No cervical adenopathy  Skin:     General: Skin is warm and dry  Findings: Rash (L face/neck with vesicular linear pruritic rash) present  No erythema  Neurological:      Mental Status: He is alert and oriented to person, place, and time  Psychiatric:         Behavior: Behavior normal          Thought Content:  Thought content normal          Judgment: Judgment normal

## 2022-10-18 ENCOUNTER — OFFICE VISIT (OUTPATIENT)
Dept: FAMILY MEDICINE CLINIC | Facility: CLINIC | Age: 32
End: 2022-10-18
Payer: COMMERCIAL

## 2022-10-18 ENCOUNTER — LAB (OUTPATIENT)
Dept: LAB | Facility: MEDICAL CENTER | Age: 32
End: 2022-10-18
Payer: COMMERCIAL

## 2022-10-18 VITALS
BODY MASS INDEX: 29.86 KG/M2 | WEIGHT: 201.6 LBS | TEMPERATURE: 97.2 F | HEART RATE: 78 BPM | OXYGEN SATURATION: 96 % | SYSTOLIC BLOOD PRESSURE: 124 MMHG | HEIGHT: 69 IN | DIASTOLIC BLOOD PRESSURE: 82 MMHG

## 2022-10-18 DIAGNOSIS — Z13.89 SCREENING FOR MULTIPLE CONDITIONS: ICD-10-CM

## 2022-10-18 DIAGNOSIS — Z23 NEED FOR INFLUENZA VACCINATION: ICD-10-CM

## 2022-10-18 DIAGNOSIS — J30.1 ALLERGIC RHINITIS DUE TO POLLEN, UNSPECIFIED SEASONALITY: ICD-10-CM

## 2022-10-18 DIAGNOSIS — J30.89 ALLERGIC RHINITIS DUE TO MOLD: ICD-10-CM

## 2022-10-18 DIAGNOSIS — Z00.00 WELL ADULT EXAM: Primary | ICD-10-CM

## 2022-10-18 LAB
ALBUMIN SERPL BCP-MCNC: 3.5 G/DL (ref 3.5–5)
ALP SERPL-CCNC: 69 U/L (ref 46–116)
ALT SERPL W P-5'-P-CCNC: 42 U/L (ref 12–78)
ANION GAP SERPL CALCULATED.3IONS-SCNC: 3 MMOL/L (ref 4–13)
AST SERPL W P-5'-P-CCNC: 22 U/L (ref 5–45)
BILIRUB SERPL-MCNC: 0.66 MG/DL (ref 0.2–1)
BUN SERPL-MCNC: 11 MG/DL (ref 5–25)
CALCIUM SERPL-MCNC: 9.9 MG/DL (ref 8.3–10.1)
CHLORIDE SERPL-SCNC: 106 MMOL/L (ref 96–108)
CHOLEST SERPL-MCNC: 212 MG/DL
CO2 SERPL-SCNC: 29 MMOL/L (ref 21–32)
CREAT SERPL-MCNC: 1.24 MG/DL (ref 0.6–1.3)
ERYTHROCYTE [DISTWIDTH] IN BLOOD BY AUTOMATED COUNT: 12.9 % (ref 11.6–15.1)
GFR SERPL CREATININE-BSD FRML MDRD: 77 ML/MIN/1.73SQ M
GLUCOSE P FAST SERPL-MCNC: 95 MG/DL (ref 65–99)
HCT VFR BLD AUTO: 49.5 % (ref 36.5–49.3)
HDLC SERPL-MCNC: 50 MG/DL
HGB BLD-MCNC: 15.9 G/DL (ref 12–17)
LDLC SERPL CALC-MCNC: 143 MG/DL (ref 0–100)
MCH RBC QN AUTO: 30.2 PG (ref 26.8–34.3)
MCHC RBC AUTO-ENTMCNC: 32.1 G/DL (ref 31.4–37.4)
MCV RBC AUTO: 94 FL (ref 82–98)
PLATELET # BLD AUTO: 276 THOUSANDS/UL (ref 149–390)
PMV BLD AUTO: 10 FL (ref 8.9–12.7)
POTASSIUM SERPL-SCNC: 4.3 MMOL/L (ref 3.5–5.3)
PROT SERPL-MCNC: 8 G/DL (ref 6.4–8.4)
RBC # BLD AUTO: 5.27 MILLION/UL (ref 3.88–5.62)
SODIUM SERPL-SCNC: 138 MMOL/L (ref 135–147)
TRIGL SERPL-MCNC: 94 MG/DL
TSH SERPL DL<=0.05 MIU/L-ACNC: 3.22 UIU/ML (ref 0.45–4.5)
WBC # BLD AUTO: 5.23 THOUSAND/UL (ref 4.31–10.16)

## 2022-10-18 PROCEDURE — 84443 ASSAY THYROID STIM HORMONE: CPT

## 2022-10-18 PROCEDURE — 90686 IIV4 VACC NO PRSV 0.5 ML IM: CPT | Performed by: PHYSICIAN ASSISTANT

## 2022-10-18 PROCEDURE — 80053 COMPREHEN METABOLIC PANEL: CPT

## 2022-10-18 PROCEDURE — 90471 IMMUNIZATION ADMIN: CPT | Performed by: PHYSICIAN ASSISTANT

## 2022-10-18 PROCEDURE — 36415 COLL VENOUS BLD VENIPUNCTURE: CPT

## 2022-10-18 PROCEDURE — 85027 COMPLETE CBC AUTOMATED: CPT

## 2022-10-18 PROCEDURE — 80061 LIPID PANEL: CPT

## 2022-10-18 PROCEDURE — 99395 PREV VISIT EST AGE 18-39: CPT | Performed by: PHYSICIAN ASSISTANT

## 2022-10-18 RX ORDER — FLUTICASONE PROPIONATE 50 MCG
2 SPRAY, SUSPENSION (ML) NASAL DAILY
Qty: 16 G | Refills: 11 | Status: SHIPPED | OUTPATIENT
Start: 2022-10-18

## 2022-10-18 NOTE — PROGRESS NOTES
[facility]    NAME: Lilli Chavez  AGE: 29 y o  SEX: male  : 1990     DATE: 10/18/2022     Assessment and Plan:     Problem List Items Addressed This Visit    None     Visit Diagnoses     Well adult exam    -  Primary    Need for influenza vaccination        Relevant Orders    influenza vaccine, quadrivalent, 0 5 mL, preservative-free, for adult and pediatric patients 6 mos+ (AFLURIA, FLUARIX, FLULAVAL, FLUZONE) (Completed)    Allergic rhinitis due to pollen, unspecified seasonality        Relevant Medications    fluticasone (FLONASE) 50 mcg/act nasal spray    Allergic rhinitis due to mold        Relevant Medications    fluticasone (FLONASE) 50 mcg/act nasal spray    Screening for multiple conditions        Relevant Orders    CBC    Comprehensive metabolic panel    Lipid Panel with Direct LDL reflex    TSH, 3rd generation with Free T4 reflex          Immunizations and preventive care screenings were discussed with patient today  Appropriate education was printed on patient's after visit summary  Counseling:  Dental Health: discussed importance of regular tooth brushing, flossing, and dental visits  BMI Counseling: Body mass index is 29 77 kg/m²  The BMI is above normal  Nutrition recommendations include decreasing portion sizes, encouraging healthy choices of fruits and vegetables, decreasing fast food intake, consuming healthier snacks, limiting drinks that contain sugar, moderation in carbohydrate intake, increasing intake of lean protein, reducing intake of saturated and trans fat and reducing intake of cholesterol  Exercise recommendations include moderate physical activity 150 minutes/week  Rationale for BMI follow-up plan is due to patient being overweight or obese  Depression Screening and Follow-up Plan: Patient was screened for depression during today's encounter   They screened negative with a PHQ-2 score of 0         Return in 1 year (on 10/18/2023)  Chief Complaint:     Chief Complaint   Patient presents with   • Annual Exam      History of Present Illness:     Adult Annual Physical   Patient here for a comprehensive physical exam  The patient reports problems - 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, very stressful, feels tired but it does not interefere with activities       Diet and Physical Activity  Diet/Nutrition: well balanced diet  Exercise: moderate cardiovascular exercise  Depression Screening  PHQ-2/9 Depression Screening    Little interest or pleasure in doing things: 0 - not at all  Feeling down, depressed, or hopeless: 0 - not at all  PHQ-2 Score: 0  PHQ-2 Interpretation: Negative depression screen       General Health  Sleep: sleeps well  Hearing: normal - bilateral   Vision: goes for regular eye exams  Dental: regular dental visits  Review of Systems:     Review of Systems   Constitutional: Positive for fatigue  Negative for activity change, appetite change, chills, diaphoresis, fever and unexpected weight change  HENT: Negative for congestion, ear pain, postnasal drip, rhinorrhea, sinus pressure, sinus pain, sneezing, sore throat, tinnitus and voice change  Eyes: Negative for pain, redness and visual disturbance  Respiratory: Negative for cough, chest tightness, shortness of breath and wheezing  Cardiovascular: Negative for chest pain, palpitations and leg swelling  Gastrointestinal: Negative for abdominal pain, blood in stool, constipation, diarrhea, nausea and vomiting  Genitourinary: Negative for difficulty urinating, dysuria, frequency, hematuria and urgency  Musculoskeletal: Negative for arthralgias, back pain, gait problem, joint swelling, myalgias, neck pain and neck stiffness  Skin: Negative for color change, pallor, rash and wound  Neurological: Negative for dizziness, tremors, weakness, light-headedness and headaches  Psychiatric/Behavioral: Negative for dysphoric mood, self-injury, sleep disturbance and suicidal ideas  The patient is not nervous/anxious  Past Medical History:     Past Medical History:   Diagnosis Date   • Allergic rhinitis       Past Surgical History:     History reviewed  No pertinent surgical history  Social History:     Social History     Socioeconomic History   • Marital status: Single     Spouse name: None   • Number of children: None   • Years of education: None   • Highest education level: None   Occupational History   • None   Tobacco Use   • Smoking status: Never Smoker   • Smokeless tobacco: Never Used   Vaping Use   • Vaping Use: Never used   Substance and Sexual Activity   • Alcohol use: Yes     Comment: socially   • Drug use: Not Currently   • Sexual activity: None   Other Topics Concern   • None   Social History Narrative    Daily caffeine - 2 cups coffee     Social Determinants of Health     Financial Resource Strain: Not on file   Food Insecurity: Not on file   Transportation Needs: Not on file   Physical Activity: Not on file   Stress: Not on file   Social Connections: Not on file   Intimate Partner Violence: Not on file   Housing Stability: Not on file      Family History:     Family History   Problem Relation Age of Onset   • Heart disease Father    • Diabetes Father       Current Medications:     Current Outpatient Medications   Medication Sig Dispense Refill   • fluticasone (FLONASE) 50 mcg/act nasal spray 2 sprays into each nostril daily 16 g 11     No current facility-administered medications for this visit  Allergies:     No Known Allergies   Physical Exam:     /82   Pulse 78   Temp (!) 97 2 °F (36 2 °C)   Ht 5' 9" (1 753 m)   Wt 91 4 kg (201 lb 9 6 oz)   SpO2 96%   BMI 29 77 kg/m²     Physical Exam  Vitals and nursing note reviewed  Constitutional:       Appearance: He is well-developed  HENT:      Head: Normocephalic and atraumatic     Eyes: Conjunctiva/sclera: Conjunctivae normal    Cardiovascular:      Rate and Rhythm: Normal rate and regular rhythm  Heart sounds: No murmur heard  Pulmonary:      Effort: Pulmonary effort is normal  No respiratory distress  Breath sounds: Normal breath sounds  Abdominal:      Palpations: Abdomen is soft  Tenderness: There is no abdominal tenderness  Musculoskeletal:      Cervical back: Neck supple  Skin:     General: Skin is warm and dry  Neurological:      Mental Status: He is alert            Lety Spencer PA-C   [facility]

## 2022-10-18 NOTE — PATIENT INSTRUCTIONS

## 2022-11-01 DIAGNOSIS — J30.89 ALLERGIC RHINITIS DUE TO MOLD: ICD-10-CM

## 2022-11-01 DIAGNOSIS — J30.1 ALLERGIC RHINITIS DUE TO POLLEN, UNSPECIFIED SEASONALITY: ICD-10-CM

## 2022-11-02 RX ORDER — FLUTICASONE PROPIONATE 50 MCG
SPRAY, SUSPENSION (ML) NASAL
Qty: 48 ML | Refills: 4 | OUTPATIENT
Start: 2022-11-02

## 2023-05-30 ENCOUNTER — OFFICE VISIT (OUTPATIENT)
Dept: URGENT CARE | Facility: CLINIC | Age: 33
End: 2023-05-30

## 2023-05-30 ENCOUNTER — APPOINTMENT (OUTPATIENT)
Dept: RADIOLOGY | Facility: CLINIC | Age: 33
End: 2023-05-30

## 2023-05-30 VITALS
RESPIRATION RATE: 18 BRPM | DIASTOLIC BLOOD PRESSURE: 86 MMHG | SYSTOLIC BLOOD PRESSURE: 125 MMHG | HEART RATE: 84 BPM | OXYGEN SATURATION: 100 % | TEMPERATURE: 98.7 F

## 2023-05-30 DIAGNOSIS — S39.012A LUMBAR STRAIN, INITIAL ENCOUNTER: Primary | ICD-10-CM

## 2023-05-30 DIAGNOSIS — S39.012A LUMBAR STRAIN, INITIAL ENCOUNTER: ICD-10-CM

## 2023-05-30 DIAGNOSIS — M54.50 LUMBAR PAIN: ICD-10-CM

## 2023-05-30 DIAGNOSIS — M54.16 ACUTE RIGHT LUMBAR RADICULOPATHY: ICD-10-CM

## 2023-05-30 RX ORDER — NAPROXEN 500 MG/1
500 TABLET ORAL 2 TIMES DAILY WITH MEALS
Qty: 30 TABLET | Refills: 0 | Status: SHIPPED | OUTPATIENT
Start: 2023-05-30

## 2023-05-30 RX ORDER — PREDNISONE 10 MG/1
TABLET ORAL
Qty: 24 TABLET | Refills: 0 | Status: SHIPPED | OUTPATIENT
Start: 2023-05-30

## 2023-05-30 RX ORDER — LIDOCAINE 50 MG/G
1 PATCH TOPICAL DAILY
Qty: 6 PATCH | Refills: 0 | Status: SHIPPED | OUTPATIENT
Start: 2023-05-30

## 2023-05-30 NOTE — PROGRESS NOTES
"  [facility]        NAME: Kee Hood is a 29 y o  male  : 1990    MRN: [id number]  DATE: May 30, 2023  TIME: 12:54 PM    Assessment and Plan   Lumbar strain, initial encounter [F46 042X]  1  Lumbar strain, initial encounter  XR spine lumbar 2 or 3 views injury    predniSONE 10 mg tablet    lidocaine (Lidoderm) 5 %    naproxen (Naprosyn) 500 mg tablet      2  Lumbar pain  XR spine lumbar 2 or 3 views injury    predniSONE 10 mg tablet    lidocaine (Lidoderm) 5 %    naproxen (Naprosyn) 500 mg tablet      3  Acute right lumbar radiculopathy  predniSONE 10 mg tablet    lidocaine (Lidoderm) 5 %    naproxen (Naprosyn) 500 mg tablet            Patient Instructions       Follow up with PCP in 3-5 days  Proceed to  ER if symptoms worsen  Your xray was preliminarily read by your provider  A radiologist will read the xray and you will be notified if it is abnormal     You have been prescribed prednisone for pain/inflammation  You may take tylenol with this for pain  Apply the lidoderm patches as prescribed  Alternate ice and heat  You are to take the naproxen for pain  You may take tylenol with this  You are NOT to take any other NSAID products with this medication or the prednisone  You are to see your PCP in 3-5 days  Go to the ED if symptoms worsen  Chief Complaint     Chief Complaint   Patient presents with   • Back Pain     C/o lower back pain radiating into right leg onset \"Saturday while doing a deck\"  Denies fall/trauma, reports straining  Hx of sciatic issues as per patient  Last dose advil today 0800         History of Present Illness       This is a 29year old male who states \"always has some sort of back pain and radiculopathy\" and this weekend was hunched over building a deck and now has right lumbar into buttock down right leg pain  Denies N/T  States had taken advil at 8 am today  Denies hx of surgery, incontinence of bowel or bladder           Review of Systems " Review of Systems   Constitutional: Negative  HENT: Negative  Eyes: Negative  Respiratory: Negative  Cardiovascular: Negative  Gastrointestinal: Negative  Endocrine: Negative  Genitourinary: Negative  Musculoskeletal: Positive for back pain  Skin: Negative  Neurological: Negative  Hematological: Negative  Psychiatric/Behavioral: Negative  Current Medications       Current Outpatient Medications:   •  lidocaine (Lidoderm) 5 %, Apply 1 patch topically over 12 hours daily Remove & Discard patch within 12 hours or as directed by MD, Disp: 6 patch, Rfl: 0  •  naproxen (Naprosyn) 500 mg tablet, Take 1 tablet (500 mg total) by mouth 2 (two) times a day with meals, Disp: 30 tablet, Rfl: 0  •  predniSONE 10 mg tablet, Take 5 tabs po x 2 days; 4 tabs po x 2 days; 3 tabs po x 1 day; 2 tabs po x 1 day  1 tab po x 1 day , Disp: 24 tablet, Rfl: 0  •  fluticasone (FLONASE) 50 mcg/act nasal spray, 2 sprays into each nostril daily, Disp: 16 g, Rfl: 11  •  fluticasone (FLONASE) 50 mcg/act nasal spray, 2 sprays into each nostril daily, Disp: 15 8 mL, Rfl: 11    Current Allergies     Allergies as of 05/30/2023   • (No Known Allergies)            The following portions of the patient's history were reviewed and updated as appropriate: allergies, current medications, past family history, past medical history, past social history, past surgical history and problem list      Past Medical History:   Diagnosis Date   • Allergic rhinitis        History reviewed  No pertinent surgical history  Family History   Problem Relation Age of Onset   • Heart disease Father    • Diabetes Father          Medications have been verified  Objective   /86 (BP Location: Left arm)   Pulse 84   Temp 98 7 °F (37 1 °C) (Temporal)   Resp 18   SpO2 100%   No LMP for male patient  Physical Exam     Physical Exam  Vitals and nursing note reviewed     Constitutional:       General: He is not in acute distress  Appearance: Normal appearance  He is normal weight  He is not ill-appearing, toxic-appearing or diaphoretic  HENT:      Head: Normocephalic and atraumatic  Nose: Nose normal       Mouth/Throat:      Mouth: Mucous membranes are moist    Eyes:      Extraocular Movements: Extraocular movements intact  Cardiovascular:      Rate and Rhythm: Normal rate  Pulses: Normal pulses  Pulmonary:      Effort: Pulmonary effort is normal    Musculoskeletal:         General: Signs of injury present  No swelling, tenderness or deformity  Normal range of motion  Cervical back: Normal range of motion  Skin:     General: Skin is warm and dry  Capillary Refill: Capillary refill takes less than 2 seconds  Neurological:      General: No focal deficit present  Mental Status: He is alert and oriented to person, place, and time  Psychiatric:         Mood and Affect: Mood normal          Behavior: Behavior normal          Thought Content:  Thought content normal          Judgment: Judgment normal          Preliminary reading lumbar xray  No acute process seen slight lumbar curvature  Waiting on rad read

## 2023-05-30 NOTE — PATIENT INSTRUCTIONS
Your xray was preliminarily read by your provider  A radiologist will read the xray and you will be notified if it is abnormal     You have been prescribed prednisone for pain/inflammation  You may take tylenol with this for pain  Apply the lidoderm patches as prescribed  Alternate ice and heat  You are to take the naproxen for pain  You may take tylenol with this  You are NOT to take any other NSAID products with this medication or the prednisone  You are to see your PCP in 3-5 days  Go to the ED if symptoms worsen

## 2023-08-22 ENCOUNTER — TELEPHONE (OUTPATIENT)
Dept: FAMILY MEDICINE CLINIC | Facility: CLINIC | Age: 33
End: 2023-08-22

## 2023-08-22 NOTE — TELEPHONE ENCOUNTER
Pt pulled something in back awhile back and was prescribed prednisone and naproxen which helped. He just pulled his back again and asking if you would give him another prescription for Prednisone?

## 2023-08-23 ENCOUNTER — OFFICE VISIT (OUTPATIENT)
Dept: FAMILY MEDICINE CLINIC | Facility: CLINIC | Age: 33
End: 2023-08-23
Payer: COMMERCIAL

## 2023-08-23 VITALS
SYSTOLIC BLOOD PRESSURE: 120 MMHG | TEMPERATURE: 97.6 F | WEIGHT: 205.2 LBS | OXYGEN SATURATION: 98 % | HEIGHT: 69 IN | DIASTOLIC BLOOD PRESSURE: 80 MMHG | BODY MASS INDEX: 30.39 KG/M2 | HEART RATE: 88 BPM

## 2023-08-23 DIAGNOSIS — M54.31 SCIATICA OF RIGHT SIDE: Primary | ICD-10-CM

## 2023-08-23 PROCEDURE — 99214 OFFICE O/P EST MOD 30 MIN: CPT | Performed by: PHYSICIAN ASSISTANT

## 2023-08-23 RX ORDER — NAPROXEN 500 MG/1
500 TABLET ORAL 2 TIMES DAILY WITH MEALS
Qty: 30 TABLET | Refills: 0 | Status: SHIPPED | OUTPATIENT
Start: 2023-08-23

## 2023-08-23 RX ORDER — PREDNISONE 10 MG/1
TABLET ORAL
Qty: 20 TABLET | Refills: 0 | Status: SHIPPED | OUTPATIENT
Start: 2023-08-23

## 2023-08-23 NOTE — ASSESSMENT & PLAN NOTE
Given prednisone taper, recommend take until finished. Pt can also use naproxen 500 mg BID PRN. Recommend heat or ice in 20 minute increments.

## 2023-08-23 NOTE — PROGRESS NOTES
Name: Jesus Chacon      : 1990      MRN: [id number]  Encounter Provider: Connie Alvarado PA-C  Encounter Date: 2023   Encounter department: [address] [place] [address] Road     1. Sciatica of right side  Assessment & Plan:  Given prednisone taper, recommend take until finished. Pt can also use naproxen 500 mg BID PRN. Recommend heat or ice in 20 minute increments. Orders:  -     naproxen (Naprosyn) 500 mg tablet; Take 1 tablet (500 mg total) by mouth 2 (two) times a day with meals  -     predniSONE 10 mg tablet; Days 1 and 2 take 4 tablets daily, days 3 and 4 take 3 tablets daily, days 5 and 6 and 2 tablets daily, days 7 and 8 take 1 tablet daily. BMI Counseling: Body mass index is 30.3 kg/m². The BMI is above normal. Nutrition recommendations include decreasing portion sizes, encouraging healthy choices of fruits and vegetables, decreasing fast food intake, consuming healthier snacks, limiting drinks that contain sugar, moderation in carbohydrate intake, increasing intake of lean protein, reducing intake of saturated and trans fat and reducing intake of cholesterol. Exercise recommendations include moderate physical activity 150 minutes/week. Rationale for BMI follow-up plan is due to patient being overweight or obese. Depression Screening and Follow-up Plan: Patient was screened for depression during today's encounter. They screened negative with a PHQ-2 score of 0. Zhane Hamm is here today after injuring his back. Pt states occurred when he lifted a vacuum. Complains of pain in low R back extending into R leg down as far as his R knee. He states has had similar episodes in the past.    Review of Systems   Constitutional: Negative for activity change, appetite change, chills, diaphoresis, fatigue, fever and unexpected weight change.    HENT: Negative for congestion, ear pain, postnasal drip, rhinorrhea, sinus pressure, sinus pain, sneezing, sore throat, tinnitus and voice change. Eyes: Negative for pain, redness and visual disturbance. Respiratory: Negative for cough, chest tightness, shortness of breath and wheezing. Cardiovascular: Negative for chest pain, palpitations and leg swelling. Gastrointestinal: Negative for abdominal pain, blood in stool, constipation, diarrhea, nausea and vomiting. Genitourinary: Negative for difficulty urinating, dysuria, frequency, hematuria and urgency. Musculoskeletal: Positive for back pain. Negative for arthralgias, gait problem, joint swelling, myalgias, neck pain and neck stiffness. Skin: Negative for color change, pallor, rash and wound. Neurological: Negative for dizziness, tremors, weakness, light-headedness and headaches. Psychiatric/Behavioral: Negative for dysphoric mood, self-injury, sleep disturbance and suicidal ideas. The patient is not nervous/anxious. Current Outpatient Medications on File Prior to Visit   Medication Sig   • fluticasone (FLONASE) 50 mcg/act nasal spray 2 sprays into each nostril daily   • fluticasone (FLONASE) 50 mcg/act nasal spray 2 sprays into each nostril daily   • [DISCONTINUED] lidocaine (Lidoderm) 5 % Apply 1 patch topically over 12 hours daily Remove & Discard patch within 12 hours or as directed by MD (Patient not taking: Reported on 8/23/2023)   • [DISCONTINUED] naproxen (Naprosyn) 500 mg tablet Take 1 tablet (500 mg total) by mouth 2 (two) times a day with meals (Patient not taking: Reported on 8/23/2023)   • [DISCONTINUED] predniSONE 10 mg tablet Take 5 tabs po x 2 days; 4 tabs po x 2 days; 3 tabs po x 1 day; 2 tabs po x 1 day. 1 tab po x 1 day. (Patient not taking: Reported on 8/23/2023)       Objective     /80   Pulse 88   Temp 97.6 °F (36.4 °C)   Ht 5' 9" (1.753 m)   Wt 93.1 kg (205 lb 3.2 oz)   SpO2 98%   BMI 30.30 kg/m²     Physical Exam  Vitals reviewed. Constitutional:       General: He is not in acute distress.      Appearance: He is well-developed. He is not diaphoretic. HENT:      Head: Normocephalic and atraumatic. Right Ear: Hearing, tympanic membrane, ear canal and external ear normal.      Left Ear: Hearing, tympanic membrane, ear canal and external ear normal.      Mouth/Throat:      Pharynx: Uvula midline. No oropharyngeal exudate. Eyes:      General: No scleral icterus. Right eye: No discharge. Left eye: No discharge. Conjunctiva/sclera: Conjunctivae normal.   Neck:      Thyroid: No thyromegaly. Vascular: No carotid bruit. Cardiovascular:      Rate and Rhythm: Normal rate and regular rhythm. Heart sounds: Normal heart sounds. No murmur heard. Pulmonary:      Effort: Pulmonary effort is normal. No respiratory distress. Breath sounds: Normal breath sounds. No wheezing. Abdominal:      General: Bowel sounds are normal. There is no distension. Palpations: Abdomen is soft. There is no mass. Tenderness: There is no abdominal tenderness. There is no guarding or rebound. Musculoskeletal:         General: No tenderness. Normal range of motion. Cervical back: Neck supple. Legs:    Lymphadenopathy:      Cervical: No cervical adenopathy. Skin:     General: Skin is warm and dry. Findings: No erythema or rash. Neurological:      Mental Status: He is alert and oriented to person, place, and time. Psychiatric:         Behavior: Behavior normal.         Thought Content:  Thought content normal.         Judgment: Judgment normal.       Sneha Keane PA-C

## 2023-09-26 ENCOUNTER — TELEPHONE (OUTPATIENT)
Dept: FAMILY MEDICINE CLINIC | Facility: CLINIC | Age: 33
End: 2023-09-26

## 2023-09-26 NOTE — TELEPHONE ENCOUNTER
For what diagnosis? I can refer him to [facility] next door. ----- Message from Li Dunbar sent at 9/25/2023 10:01 AM EDT -----  Regarding: FW: Referral   Contact: [phone]    ----- Message -----  From: Perla Fernando"  Sent: 9/25/2023   9:53 AM EDT  To: [facility]  Subject: Referral                                         Hi, I was wondering if I can have a referral and recommendation for therapy? Aparna Holm been thinking about it for awhile and I think it’s time I’d like to talk to someone. I was going to wait for my yearly physical but I can’t schedule that until the end of Oct. I tried looking around on [employer] marguerite for providers but it’s a little confusing so I figured I start by asking you.  Thank you

## 2023-09-28 DIAGNOSIS — F41.9 ANXIETY: Primary | ICD-10-CM

## 2023-10-13 ENCOUNTER — OFFICE VISIT (OUTPATIENT)
Age: 33
End: 2023-10-13
Payer: COMMERCIAL

## 2023-10-13 DIAGNOSIS — F41.1 GENERALIZED ANXIETY DISORDER: Primary | ICD-10-CM

## 2023-10-13 PROCEDURE — 90791 PSYCH DIAGNOSTIC EVALUATION: CPT

## 2023-10-13 NOTE — BH TREATMENT PLAN
[facility] Integration Treatment Plan    Jose Antonio Espino  1990  Date of Treatment Plan: 10/13/23      Treatment Goals (after each item selected, indicate outcome measures; (ie: as evidenced by)    [] Reduce Risk Factors of:    [x] Reduce Major Symptoms of: anxiety and depression   [] Ameliorate Functional Impairments of:    [] Develop Coping Strategies to Address Stress of:    [] Stabilize (short term) Crisis of:   [x] Maintain (long term) Stabilization of Symptoms of: lack of communication   [x] Medication Referral to: PCP    [] Maintain Sobriety:     Planned Interventions- Patient Participation (must be consistent with treatment goals):    [] Assertiveness Training [] Problem Solving Skills Training   [] Anger Management [] Solution Focused Techniques   [x] Affect Identification and Expression [] Stress Management   [] Cognitive Restructuring [] Supportive Therapy   [x] Communication Training  [] Self/Other Weakley Training    [x] Grief Work  [] Decision Options Exploration   [] Imagery/Relaxation Training [] Decision Option Exploration   [] Parent Training  [] Pattern Identification and Interruption       [] Engage Significant Others in Treatment:   [] Facilitate Decision Making Regarding:   [] Explore/Monitor:    [x] Teach Skills of: coping skills, grounding techniques, breathing exercises   [x] Educate Regarding: non-violent communication, emotion expression   [] Assign Readings:   [] Referrals Planned:   [] Use of Resources/Strengths:   [] Preventative Strategies:   [] Obstacles to Change:     Estimated Time Frames:     Goal 1: 04/13/24   Goal 2: 04/13/24   Goal 3: 04/13/24    I have been provided education on my primary diagnosis of:   1. Generalized anxiety disorder             My therapist and I have developed this plan together, and I am in agreement to working on these issues and goals. I understand the plan that has been developed for my treatment. [x] Patient Declined copy of treatment plan. Copy in [employer].

## 2023-10-13 NOTE — BH CRISIS PLAN
Client Name: Xiang Bliss       Client YOB: 1990  : 1990    Treatment Team (include name and contact information):     Psychotherapist: Nancy Trimble LPC     Psychiatrist: None   Release of information completed: no    : None   Release of information completed: no    Other (Specify Role): None    Release of information completed: no    Other (Specify Role): None   Release of information completed: no    Healthcare Provider  Paty Villarreal PA-C  [address] [facility] [address]  [phone]    Type of Plan   * Child plans (children 15 yo and younger) must be completed and signed by the child's legal guardian   * Plans for all individuals 15 yo and above must be signed by the client. Plan Type: adolescent/adult (15 and over) Initial      My Personal Strengths are (in the client's own words):  "Resourceful"    The stressors and triggers that may put me at risk are:  Passive aggressive and communication difficulities, break up with ex-girlfriend, everyday stressors, financial problems, job loss, job stress, medical illness in family, and ongoing anxiety    Coping skills I can use to keep myself calm and safe:  Physical activity and Other (describe) doing a house project, distracation    Coping skills/supports I can use to maintain abstinence from substance use:   Not Applicable    The people that provide me with help and support: (Include name, contact, and how they can help)   Support person #1: Maitechanning Cathyradhika - friend     * Phone number: in cell phone    * How can they help me? Spend time together, distracts     Support person #2:Sandra Godinez    * Phone number: in cell phone    * How can they help me? Supportive, listen, recommended therapy     Support person #3: Mom -    * Phone number: in cell phone    * How can they help me?  Talk with me    In the past, the following has helped me in times of crisis:    Being in a quiet space, Being with other people, Taking a walk or exercising, and Watching television or a movie      If it is an emergency and you need immediate help, call     If there is a possibility of danger to yourself or others, call the following crisis hotline resources:     Adult Crisis Numbers  Suicide Prevention Hotline - Dial   [facility]: [address]: [address]: [address]: [phone]  [facility]: [phone]  [facility]: [address]: [address]: [phone] (daytime). [phone] (after hours, weekends, holidays)     Child/Adolescent Crisis Numbers   [facility]: [address]: [phone]   [facility]: [phone]   [facility]: [phone]    Please note: Some [facility] do not have a separate number for Child/Adolescent specific crisis. If your county is not listed under Child/Adolescent, please call the adult number for your county     National Talk to Text Line   All Ages - [phone]    In the event your feelings become unmanageable, and you cannot reach your support system, you will call 911 immediately or go to the nearest hospital emergency room.

## 2023-10-13 NOTE — PSYCH
Behavioral Health Psychotherapy Assessment    Date of Initial Psychotherapy Assessment: 10/13/23  Referral Source: Dr. Refugio Leavitt  Has a release of information been signed for the referral source? No    Preferred Name: Johnson Clayton  Preferred Pronouns: He/him  YOB: 1990 Age: 34 y.o. Sex assigned at birth: male   Gender Identity: male  Race:   Preferred Language: English    Emergency Contact:  Full Name: Melissa Devine  Relationship to Client: father   Contact information: [phone]    Primary Care Physician:  Merlene Ponce PA-C  [address] [address]  [phone]  Has a release of information been signed? No    Physical Health History:  Past surgical procedures: none reported  Do you have a history of any of the following: None reported  Do you have any mobility issues? No    Relevant Family History:  Dad - depression, anxiety, dad attempted suicide  Brother- death by suicide, possibly bipolar, treated for ADHD  Reported many family members were being medicated for something, he's just not sure what    Presenting Problem (What brings you in?)  Jose M Nancetorin reported the main concerns were being tired, lots of anxiety and lack of energy and motivation. He shared that his anxiety is presented as worrying, thinking negatively, pacing - walking around not doing anything, difficulty sitting still, difficulty concentrating, racing thoughts, bad habit of not finishing things and difficult controlling thoughts. He reported that his sleep is good, but not feeling rested. "I can do things but then after an hour or so I get wiped out". No significant changes to appetite He reported ADLs as limited difficulties. He finds that he cleans to stop from worrying. "If I sit I get anxiety". He reported having anxiety since high school after brother  by suicide. He didn't notice the worsening anxiousness until a few years after the brother's death.  The worst anxiety started a few years ago during Covid. He stated it was "worst was last fall because of business - filing bankruptcy". He reported current stressors are career change, recent relationship ending, unmanaged history of mental health issues, finances and being involved in a lot. He stated he attended 2 semesters at [employer] [place] and then moved back to go to [employer]. 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 He has a associates degree mechanical engineering. He thinks he will end up going back to school to allow for more opportunities in the job market. He reported that money and time are what's holding him back     Denied suicidal ideation, HI, SIB - "I always look toward the future. I see how it effects others"    "Staying busy helps, when I start sitting it gets bad, more relaxed at night after dinner"    Mental Health Advance Directive:  Do you currently have a Mental Health Advance Directive?yes    Diagnosis:   Diagnosis ICD-10-CM Associated Orders   1.  Generalized anxiety disorder  F41.1           Initial Assessment:     Current Mental Status:    Appearance: appropriate      Behavior/Manner: cooperative      Affect/Mood:  Anxious and depressed    Speech:  Normal    Sleep:  Normal    Oriented to: oriented to self, oriented to place and oriented to time       Clinical Symptoms    Depression: yes      Anxiety: yes      Depression Symptoms: restlessness, serious loss of interest in things, poor concentration and irritable      Anxiety Symptoms: excessive worry, fatigues easily, irritable, nervous/anxious, difficulty controlling worry and restlessness      Have you ever been assaultive to others or the environment: No      Have you ever been self-injurious: No      Counseling History:  Previous Counseling or Treatment  (Mental Health or Drug & Alcohol): Yes    Previous Counseling Details:  [facility] - didn't like virtual and expenisve   Have you previously taken psychiatric medications: No      Suicide Risk Assessment  Have you had incidents of suicidal ideation: No    Are you currently experiencing suicidal thoughts: No      Substance Abuse/Addiction Assessment:  Alcohol: Yes    Frequency:  Monthly  Amount:  1 or 2 drinks - makes me feel more depressed  Heroin: No    Fentanyl: No    Opiates: No    Cocaine: No    Amphetamines: No    Hallucinogens: No    Club Drugs: No    Benzodiazepines: No    Other Rx Meds: No    Marijuana: Yes    Frequency:  Yearly  Method:  Smoke/pipe  Last Use:  Year ago - can't remember last time used  Tobacco/Nicotine: No    Have you experienced blackouts as a result of substance use: No    Have you had any periods of abstinence: No    Have you experienced symptoms of withdrawal: No    Have you ever overdosed on any substances?: No    Are you currently using any Medication Assisted Treatment for Substance Use: No      Disordered Eating History:  Do you have a history of disordered eating: No      Social Determinants of Health:    SDOH:  Financial instability, unemployment/underemployment and stress    Trauma and Abuse History:    Have you ever been abused: No      Legal History:    Have you ever been arrested  or had a DUI: No      Have you been incarcerated: No      Are you currently on parole/probation: No      Any current Children and Youth involvement: No      Any pending legal charges: No      Relationship History:    Current marital status: single      Natural Supports: Mother, father, cousins and friends    Relationship History:   Mother - talked to everyday and still love but there is still some resentment, remarried and not getting , mother left 2/3 years before brother taking life, addicted to opioids, clean and found out a few years ago 2016  No other siblings   Close to dad's side - not as close to mother's side   Dad from [place], [place] from [address] [facility] friend and , working together since 2015  Lives with dad - taking over house financially, working on creating a separate living space     Employment History    Are you currently employed: Yes      Employer/ Job title:   Odd jobs    Sources of income/financial support:  Work     History:      Status: no history of [address] duty  Educational History:     Have you ever been diagnosed with a learning disability: No      Highest level of education:  Associates Degree    Have you ever had an IEP or 504-plan: No      Do you need assistance with reading or writing: No      Recommended Treatment:     Psychotherapy:  Individual sessions    Frequency:  1 time    Session frequency:  Weekly    Visit start and stop times:    10/13/23  Start Time: 1104  Stop Time: 1156  Total Visit Time: 52 minutes

## 2023-10-20 ENCOUNTER — APPOINTMENT (OUTPATIENT)
Dept: LAB | Facility: HOSPITAL | Age: 33
End: 2023-10-20
Payer: COMMERCIAL

## 2023-10-20 ENCOUNTER — OFFICE VISIT (OUTPATIENT)
Dept: FAMILY MEDICINE CLINIC | Facility: CLINIC | Age: 33
End: 2023-10-20

## 2023-10-20 VITALS
WEIGHT: 200 LBS | SYSTOLIC BLOOD PRESSURE: 120 MMHG | BODY MASS INDEX: 29.62 KG/M2 | TEMPERATURE: 97.4 F | HEART RATE: 75 BPM | HEIGHT: 69 IN | DIASTOLIC BLOOD PRESSURE: 76 MMHG | OXYGEN SATURATION: 98 %

## 2023-10-20 DIAGNOSIS — Z13.89 SCREENING FOR MULTIPLE CONDITIONS: ICD-10-CM

## 2023-10-20 DIAGNOSIS — Z23 ENCOUNTER FOR IMMUNIZATION: ICD-10-CM

## 2023-10-20 DIAGNOSIS — Z20.2 POTENTIAL EXPOSURE TO STD: ICD-10-CM

## 2023-10-20 DIAGNOSIS — Z00.00 WELL ADULT EXAM: Primary | ICD-10-CM

## 2023-10-20 DIAGNOSIS — F41.9 ANXIETY AND DEPRESSION: ICD-10-CM

## 2023-10-20 DIAGNOSIS — F32.A ANXIETY AND DEPRESSION: ICD-10-CM

## 2023-10-20 LAB
ALBUMIN SERPL BCP-MCNC: 4.7 G/DL (ref 3.5–5)
ALP SERPL-CCNC: 54 U/L (ref 34–104)
ALT SERPL W P-5'-P-CCNC: 31 U/L (ref 7–52)
ANION GAP SERPL CALCULATED.3IONS-SCNC: 7 MMOL/L
AST SERPL W P-5'-P-CCNC: 24 U/L (ref 13–39)
BILIRUB SERPL-MCNC: 0.64 MG/DL (ref 0.2–1)
BUN SERPL-MCNC: 13 MG/DL (ref 5–25)
CALCIUM SERPL-MCNC: 9.9 MG/DL (ref 8.4–10.2)
CHLORIDE SERPL-SCNC: 103 MMOL/L (ref 96–108)
CHOLEST SERPL-MCNC: 198 MG/DL
CO2 SERPL-SCNC: 29 MMOL/L (ref 21–32)
CREAT SERPL-MCNC: 1.06 MG/DL (ref 0.6–1.3)
ERYTHROCYTE [DISTWIDTH] IN BLOOD BY AUTOMATED COUNT: 12.6 % (ref 11.6–15.1)
GFR SERPL CREATININE-BSD FRML MDRD: 93 ML/MIN/1.73SQ M
GLUCOSE P FAST SERPL-MCNC: 99 MG/DL (ref 65–99)
HAV IGM SER QL: NORMAL
HBV CORE IGM SER QL: NORMAL
HBV SURFACE AG SER QL: NORMAL
HCT VFR BLD AUTO: 48.1 % (ref 36.5–49.3)
HCV AB SER QL: NORMAL
HDLC SERPL-MCNC: 51 MG/DL
HGB BLD-MCNC: 15.7 G/DL (ref 12–17)
HIV 1+2 AB+HIV1 P24 AG SERPL QL IA: NORMAL
HIV 2 AB SERPL QL IA: NORMAL
HIV1 AB SERPL QL IA: NORMAL
HIV1 P24 AG SERPL QL IA: NORMAL
LDLC SERPL CALC-MCNC: 124 MG/DL (ref 0–100)
MCH RBC QN AUTO: 29.8 PG (ref 26.8–34.3)
MCHC RBC AUTO-ENTMCNC: 32.6 G/DL (ref 31.4–37.4)
MCV RBC AUTO: 91 FL (ref 82–98)
PLATELET # BLD AUTO: 254 THOUSANDS/UL (ref 149–390)
PMV BLD AUTO: 9.7 FL (ref 8.9–12.7)
POTASSIUM SERPL-SCNC: 4.1 MMOL/L (ref 3.5–5.3)
PROT SERPL-MCNC: 7.3 G/DL (ref 6.4–8.4)
RBC # BLD AUTO: 5.26 MILLION/UL (ref 3.88–5.62)
SODIUM SERPL-SCNC: 139 MMOL/L (ref 135–147)
TREPONEMA PALLIDUM IGG+IGM AB [PRESENCE] IN SERUM OR PLASMA BY IMMUNOASSAY: NORMAL
TRIGL SERPL-MCNC: 117 MG/DL
TSH SERPL DL<=0.05 MIU/L-ACNC: 2.19 UIU/ML (ref 0.45–4.5)
WBC # BLD AUTO: 4.78 THOUSAND/UL (ref 4.31–10.16)

## 2023-10-20 PROCEDURE — 86695 HERPES SIMPLEX TYPE 1 TEST: CPT

## 2023-10-20 PROCEDURE — 87389 HIV-1 AG W/HIV-1&-2 AB AG IA: CPT

## 2023-10-20 PROCEDURE — 87491 CHLMYD TRACH DNA AMP PROBE: CPT

## 2023-10-20 PROCEDURE — 84443 ASSAY THYROID STIM HORMONE: CPT

## 2023-10-20 PROCEDURE — 85027 COMPLETE CBC AUTOMATED: CPT

## 2023-10-20 PROCEDURE — 80061 LIPID PANEL: CPT

## 2023-10-20 PROCEDURE — 80053 COMPREHEN METABOLIC PANEL: CPT

## 2023-10-20 PROCEDURE — 86780 TREPONEMA PALLIDUM: CPT

## 2023-10-20 PROCEDURE — 86696 HERPES SIMPLEX TYPE 2 TEST: CPT

## 2023-10-20 PROCEDURE — 36415 COLL VENOUS BLD VENIPUNCTURE: CPT

## 2023-10-20 PROCEDURE — 87591 N.GONORRHOEAE DNA AMP PROB: CPT

## 2023-10-20 PROCEDURE — 80074 ACUTE HEPATITIS PANEL: CPT

## 2023-10-20 RX ORDER — IMIQUIMOD 12.5 MG/.25G
1 CREAM TOPICAL 2 TIMES WEEKLY
COMMUNITY
Start: 2023-10-14

## 2023-10-20 NOTE — PATIENT INSTRUCTIONS
Wellness Visit for Adults   AMBULATORY CARE:   A wellness visit  is when you see your healthcare provider to get screened for health problems. Your healthcare provider will also give you advice on how to stay healthy. Write down your questions so you remember to ask them. Ask your healthcare provider how often you should have a wellness visit. What happens at a wellness visit:  Your healthcare provider will ask about your health, and your family history of health problems. This includes high blood pressure, heart disease, and cancer. He or she will ask if you have symptoms that concern you, if you smoke, and about your mood. You may also be asked about your intake of medicines, supplements, food, and alcohol. Any of the following may be done: Your weight  will be checked. Your height may also be checked so your body mass index (BMI) can be calculated. Your BMI shows if you are at a healthy weight. Your blood pressure  and heart rate will be checked. Your temperature may also be checked. Blood and urine tests  may be done. Blood tests may be done to check your cholesterol levels. Abnormal cholesterol levels increase your risk for heart disease and stroke. You may also need a blood or urine test to check for diabetes if you are at increased risk. Urine tests may be done to look for signs of an infection or kidney disease. A physical exam  includes checking your heartbeat and lungs with a stethoscope. Your healthcare provider may also check your skin to look for sun damage. Screening tests  may be recommended. A screening test is done to check for diseases that may not cause symptoms. The screening tests you may need depend on your age, gender, family history, and lifestyle habits. For example, colorectal screening may be recommended if you are 48years old or older. Screening tests you need if you are a woman:   A Pap smear  is used to screen for cervical cancer.  Pap smears are usually done every 3 to 5 years depending on your age. You may need them more often if you have had abnormal Pap smear test results in the past. Ask your healthcare provider how often you should have a Pap smear. A mammogram  is an x-ray of your breasts to screen for breast cancer. Experts recommend mammograms every 2 years starting at age 48 years. You may need a mammogram at age 52 years or younger if you have an increased risk for breast cancer. Talk to your healthcare provider about when you should start having mammograms and how often you need them. Vaccines you may need:   Get an influenza vaccine  every year. The influenza vaccine protects you from the flu. Several types of viruses cause the flu. The viruses change over time, so new vaccines are made each year. Get a tetanus-diphtheria (Td) booster vaccine  every 10 years. This vaccine protects you against tetanus and diphtheria. Tetanus is a severe infection that may cause painful muscle spasms and lockjaw. Diphtheria is a severe bacterial infection that causes a thick covering in the back of your mouth and throat. Get a human papillomavirus (HPV) vaccine  if you are female and aged 23 to 32 or male 23 to 24 and never received it. This vaccine protects you from HPV infection. HPV is the most common infection spread by sexual contact. HPV may also cause vaginal, penile, and anal cancers. Get a pneumococcal vaccine  if you are aged 72 years or older. The pneumococcal vaccine is an injection given to protect you from pneumococcal disease. Pneumococcal disease is an infection caused by pneumococcal bacteria. The infection may cause pneumonia, meningitis, or an ear infection. Get a shingles vaccine  if you are 60 or older, even if you have had shingles before. The shingles vaccine is an injection to protect you from the varicella-zoster virus. This is the same virus that causes chickenpox.  Shingles is a painful rash that develops in people who had chickenpox or have been exposed to the virus. How to eat healthy:  My Plate is a model for planning healthy meals. It shows the types and amounts of foods that should go on your plate. Fruits and vegetables make up about half of your plate, and grains and protein make up the other half. A serving of dairy is included on the side of your plate. The amount of calories and serving sizes you need depends on your age, gender, weight, and height. Examples of healthy foods are listed below:  Eat a variety of vegetables  such as dark green, red, and orange vegetables. You can also include canned vegetables low in sodium (salt) and frozen vegetables without added butter or sauces. Eat a variety of fresh fruits , canned fruit in 100% juice, frozen fruit, and dried fruit. Include whole grains. At least half of the grains you eat should be whole grains. Examples include whole-wheat bread, wheat pasta, brown rice, and whole-grain cereals such as oatmeal.    Eat a variety of protein foods such as seafood (fish and shellfish), lean meat, and poultry without skin (turkey and chicken). Examples of lean meats include pork leg, shoulder, or tenderloin, and beef round, sirloin, tenderloin, and extra lean ground beef. Other protein foods include eggs and egg substitutes, beans, peas, soy products, nuts, and seeds. Choose low-fat dairy products such as skim or 1% milk or low-fat yogurt, cheese, and cottage cheese. Limit unhealthy fats  such as butter, hard margarine, and shortening. Exercise:  Exercise at least 30 minutes per day on most days of the week. Some examples of exercise include walking, biking, dancing, and swimming. You can also fit in more physical activity by taking the stairs instead of the elevator or parking farther away from stores. Include muscle strengthening activities 2 days each week. Regular exercise provides many health benefits.  It helps you manage your weight, and decreases your risk for type 2 diabetes, heart disease, stroke, and high blood pressure. Exercise can also help improve your mood. Ask your healthcare provider about the best exercise plan for you. General health and safety guidelines:   Do not smoke. Nicotine and other chemicals in cigarettes and cigars can cause lung damage. Ask your healthcare provider for information if you currently smoke and need help to quit. E-cigarettes or smokeless tobacco still contain nicotine. Talk to your healthcare provider before you use these products. Limit alcohol. A drink of alcohol is 12 ounces of beer, 5 ounces of wine, or 1½ ounces of liquor. Lose weight, if needed. Being overweight increases your risk of certain health conditions. These include heart disease, high blood pressure, type 2 diabetes, and certain types of cancer. Protect your skin. Do not sunbathe or use tanning beds. Use sunscreen with a SPF 15 or higher. Apply sunscreen at least 15 minutes before you go outside. Reapply sunscreen every 2 hours. Wear protective clothing, hats, and sunglasses when you are outside. Drive safely. Always wear your seatbelt. Make sure everyone in your car wears a seatbelt. A seatbelt can save your life if you are in an accident. Do not use your cell phone when you are driving. This could distract you and cause an accident. Pull over if you need to make a call or send a text message. Practice safe sex. Use latex condoms if are sexually active and have more than one partner. Your healthcare provider may recommend screening tests for sexually transmitted infections (STIs). Wear helmets, lifejackets, and protective gear. Always wear a helmet when you ride a bike or motorcycle, go skiing, or play sports that could cause a head injury. Wear protective equipment when you play sports. Wear a lifejacket when you are on a boat or doing water sports.     © Copyright [employer] 2023 Information is for End User's use only and may not be sold, redistributed or otherwise used for commercial purposes. The above information is an  only. It is not intended as medical advice for individual conditions or treatments. Talk to your doctor, nurse or pharmacist before following any medical regimen to see if it is safe and effective for you.

## 2023-10-20 NOTE — PROGRESS NOTES
[facility]    NAME: Parveen Oquendo  AGE: 34 y.o. SEX: male  : 1990     DATE: 10/20/2023     Assessment and Plan:     Problem List Items Addressed This Visit        Other    Anxiety and depression     Initiate treatment with sertraline 50 mg daily. Pt to RTO 1 month for follow up. Continue seeing therapist weekly. Relevant Medications    sertraline (ZOLOFT) 50 mg tablet    Potential exposure to STD     Pt requesting STD screening, states asymptomatic, unsure if had direct exposure to anything in particular. Relevant Orders    Chlamydia/GC amplified DNA by PCR    Hepatitis panel, acute    Herpes I/II IgG Antibodies    RPR-Syphilis Screening (Total Syphilis IGG/IGM)    HIV 1/2 AB/AG w Reflex [facility] for 2 yr old and above    Screening for multiple conditions     Pt to have fasting labs completed at his convenience. Relevant Orders    CBC    Comprehensive metabolic panel    Lipid Panel with Direct LDL reflex    TSH, 3rd generation with Free T4 reflex    Encounter for immunization    Relevant Orders    influenza vaccine, quadrivalent, 0.5 mL, preservative-free, for adult and pediatric patients 6 mos+ (AFLURIA, FLUARIX, FLULAVAL, FLUZONE)   Other Visit Diagnoses     Well adult exam    -  Primary          Immunizations and preventive care screenings were discussed with patient today. Appropriate education was printed on patient's after visit summary. Counseling:  Dental Health: discussed importance of regular tooth brushing, flossing, and dental visits. Depression Screening and Follow-up Plan: Patient was screened for depression during today's encounter. They screened negative with a PHQ-2 score of 0. Return in about 4 weeks (around 2023).      Chief Complaint:     Chief Complaint   Patient presents with   • Annual Exam     Pt presents in the office for a physical       History of Present Illness:     Adult Annual Physical   Patient here for a comprehensive physical exam. The patient reports problems - experiencing anxiety and depression. Started seeing therapist weekly. Would like to start anti-depressant/anti-anxiety medication, has never tried one in the past. States doesn't feel "sad", however is fatigued and lacks motivation. .    Diet and Physical Activity  Diet/Nutrition: well balanced diet. Exercise: moderate cardiovascular exercise. Depression Screening  PHQ-2/9 Depression Screening    Little interest or pleasure in doing things: 0 - not at all  Feeling down, depressed, or hopeless: 0 - not at all  PHQ-2 Score: 0  PHQ-2 Interpretation: Negative depression screen       General Health  Sleep: sleeps well. Hearing: normal - bilateral.  Vision: goes for regular eye exams. Dental: regular dental visits. Review of Systems:     Review of Systems   Constitutional:  Negative for activity change, appetite change, chills, diaphoresis, fatigue, fever and unexpected weight change. HENT:  Negative for congestion, ear pain, postnasal drip, rhinorrhea, sinus pressure, sinus pain, sneezing, sore throat, tinnitus and voice change. Eyes:  Negative for pain, redness and visual disturbance. Respiratory:  Negative for cough, chest tightness, shortness of breath and wheezing. Cardiovascular:  Negative for chest pain, palpitations and leg swelling. Gastrointestinal:  Negative for abdominal pain, blood in stool, constipation, diarrhea, nausea and vomiting. Genitourinary:  Negative for difficulty urinating, dysuria, frequency, hematuria and urgency. Musculoskeletal:  Negative for arthralgias, back pain, gait problem, joint swelling, myalgias, neck pain and neck stiffness. Skin:  Negative for color change, pallor, rash and wound. Neurological:  Negative for dizziness, tremors, weakness, light-headedness and headaches. Psychiatric/Behavioral:  Positive for dysphoric mood. Negative for self-injury, sleep disturbance and suicidal ideas. The patient is nervous/anxious. Past Medical History:     Past Medical History:   Diagnosis Date   • Allergic rhinitis       Past Surgical History:     History reviewed. No pertinent surgical history. Social History:     Social History     Socioeconomic History   • Marital status: Single     Spouse name: None   • Number of children: None   • Years of education: None   • Highest education level: None   Occupational History   • None   Tobacco Use   • Smoking status: Never   • Smokeless tobacco: Never   Vaping Use   • Vaping Use: Never used   Substance and Sexual Activity   • Alcohol use: Yes     Comment: socially   • Drug use: Not Currently   • Sexual activity: None   Other Topics Concern   • None   Social History Narrative    Daily caffeine - 2 cups coffee     Social Determinants of Health     Financial Resource Strain: Not on file   Food Insecurity: Not on file   Transportation Needs: Not on file   Physical Activity: Not on file   Stress: Not on file   Social Connections: Not on file   Intimate Partner Violence: Not on file   Housing Stability: Not on file      Family History:     Family History   Problem Relation Age of Onset   • Heart disease Father    • Diabetes Father       Current Medications:     Current Outpatient Medications   Medication Sig Dispense Refill   • fluticasone (FLONASE) 50 mcg/act nasal spray 2 sprays into each nostril daily 16 g 11   • fluticasone (FLONASE) 50 mcg/act nasal spray 2 sprays into each nostril daily 15.8 mL 11   • sertraline (ZOLOFT) 50 mg tablet Take 1 tablet (50 mg total) by mouth daily 30 tablet 0   • imiquimod (ALDARA) 5 % cream Apply 1 packet topically 2 (two) times a week       No current facility-administered medications for this visit.       Allergies:     No Known Allergies   Physical Exam:     /76   Pulse 75   Temp (!) 97.4 °F (36.3 °C)   Ht 5' 9" (1.753 m)   Wt 90.7 kg (200 lb)   SpO2 98% BMI 29.53 kg/m²     Physical Exam  Vitals and nursing note reviewed. Constitutional:       General: He is not in acute distress. Appearance: He is well-developed. HENT:      Head: Normocephalic and atraumatic. Eyes:      Conjunctiva/sclera: Conjunctivae normal.   Cardiovascular:      Rate and Rhythm: Normal rate and regular rhythm. Heart sounds: No murmur heard. Pulmonary:      Effort: Pulmonary effort is normal. No respiratory distress. Breath sounds: Normal breath sounds. Abdominal:      Palpations: Abdomen is soft. Tenderness: There is no abdominal tenderness. Musculoskeletal:         General: No swelling. Cervical back: Neck supple. Skin:     General: Skin is warm and dry. Capillary Refill: Capillary refill takes less than 2 seconds. Neurological:      Mental Status: He is alert.    Psychiatric:         Mood and Affect: Mood normal.          IRINA Blum

## 2023-10-20 NOTE — ASSESSMENT & PLAN NOTE
Initiate treatment with sertraline 50 mg daily. Pt to RTO 1 month for follow up. Continue seeing therapist weekly.

## 2023-10-20 NOTE — ASSESSMENT & PLAN NOTE
Pt requesting STD screening, states asymptomatic, unsure if had direct exposure to anything in particular.

## 2023-10-21 LAB
HSV1 IGG SER IA-ACNC: <0.91 INDEX (ref 0–0.9)
HSV2 IGG SER IA-ACNC: <0.91 INDEX (ref 0–0.9)

## 2023-10-23 ENCOUNTER — TELEPHONE (OUTPATIENT)
Dept: PSYCHIATRY | Facility: CLINIC | Age: 33
End: 2023-10-23

## 2023-10-23 LAB
C TRACH DNA SPEC QL NAA+PROBE: NEGATIVE
N GONORRHOEA DNA SPEC QL NAA+PROBE: NEGATIVE

## 2023-10-23 NOTE — TELEPHONE ENCOUNTER
Left vm with patient to inform of appointment cancellation with Provider, advised patient someone will call back later today to reschedule.

## 2023-10-25 NOTE — TELEPHONE ENCOUNTER
Emily Hart  requested a call back to discuss reschedule his appt. They can be reached at P# [phone]. Thank you.

## 2023-10-26 ENCOUNTER — SOCIAL WORK (OUTPATIENT)
Age: 33
End: 2023-10-26
Payer: COMMERCIAL

## 2023-10-26 DIAGNOSIS — F41.1 GENERALIZED ANXIETY DISORDER: Primary | ICD-10-CM

## 2023-10-26 PROCEDURE — 90834 PSYTX W PT 45 MINUTES: CPT

## 2023-10-26 NOTE — PSYCH
Behavioral Health Psychotherapy Progress Note    Psychotherapy Provided: Individual Psychotherapy     1. Generalized anxiety disorder            Goals addressed in session: Goal 1     DATA: Ervin Morales arrived for his in person session. He discussed medications that PCP prescribed. Took 50 mg zoloft for one day and felt funny. He felt it didn't help with the anxiety. He is questioning whether he wants to take it again or not. He stated today was a more anxious day. Stated his ex-girlfriend wanted to get together for dinner tomorrow night. He felt his mind was racing about what she would want to talk about. Explored different avenues the conversation could take. He would want the relationship to work but he isn't sure what she wants. Talked about different coping skills and plan to get through the next two days of feeling more anxious. During this session, this clinician used the following therapeutic modalities: Mindfulness-based Strategies, Solution-Focused Therapy, and Supportive Psychotherapy    Substance Abuse was not addressed during this session. If the client is diagnosed with a co-occurring substance use disorder, please indicate any changes in the frequency or amount of use: n/a. Stage of change for addressing substance use diagnoses: No substance use/Not applicable    ASSESSMENT:  Merry Schafer presents with a Anxious mood. his affect is Normal range and intensity, which is congruent, with his mood and the content of the session. The client has made progress on their goals. Ervin Morales presented as engaged but skeptical. He questioned if he had anxiety and if the medication was needed. Merry Schafer presents with a minimal risk of suicide, minimal risk of self-harm, and none risk of harm to others. For any risk assessment that surpasses a "low" rating, a safety plan must be developed.     A safety plan was indicated: no  If yes, describe in detail     PLAN: Between sessions, Merry Schafer will engage in coping skills to find ones that decrease anxiousness. At the next session, the therapist will use Mindfulness-based Strategies to address gaining skills. Behavioral Health Treatment Plan and Discharge Planning: Virginia Valles is aware of and agrees to continue to work on their treatment plan. They have identified and are working toward their discharge goals.  yes    Visit start and stop times:    10/26/23  Start Time: 1304  Stop Time: 1351  Total Visit Time: 47 minutes

## 2023-10-27 ENCOUNTER — TELEPHONE (OUTPATIENT)
Dept: FAMILY MEDICINE CLINIC | Facility: CLINIC | Age: 33
End: 2023-10-27

## 2023-10-27 NOTE — TELEPHONE ENCOUNTER
Was to see Suzanne Viveros, she suggested a medication for patient yesterday the she thinks would be a good fit for patient. He did not like the zoloft. He does not remember the name of the med, said it sounds like atarax? (Maybe ativan). He said it would be in her note and it does the opposite of xanax if that makes sense.

## 2023-10-27 NOTE — TELEPHONE ENCOUNTER
We do not have access to her notes, he is going to have to find out what it is called and let me know so I can decide if it is appropriate.

## 2023-11-02 ENCOUNTER — SOCIAL WORK (OUTPATIENT)
Age: 33
End: 2023-11-02
Payer: COMMERCIAL

## 2023-11-02 DIAGNOSIS — F41.1 GENERALIZED ANXIETY DISORDER: Primary | ICD-10-CM

## 2023-11-02 PROCEDURE — 90834 PSYTX W PT 45 MINUTES: CPT

## 2023-11-02 NOTE — PSYCH
Behavioral Health Psychotherapy Progress Note    Psychotherapy Provided: Individual Psychotherapy     1. Generalized anxiety disorder            Goals addressed in session: Goal 1     DATA: Swetha Pierce arrived for his in person session. He shared that the dinner with his ex went well. He felt he didn't get a clear answer but she is willing to hang out more and see where it goes. He remained hopeful but is willing to be open to whatever possibility may come. He stated that he began taking the Zoloft again since Saturday at 25 mg to start. He felt relief from the physical anxiety symptoms and is willing to continue the Zoloft. He recognized that his thoughts still wandered but "it's nothing I can't handle". 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 He wants to move on and look for a new job but wants to support his friend. He reported being nervous about looking for a new job. 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 He is aware of [employer] and is willing to talk to them about resume building. He worried about his dad. He said that he father had to be taken to the hospital earlier in the week. He has chronic health conditions and worried that it will only get worse. He reported having interrupted sleep, which improved slightly after taking melatonin. Provided DBT sleep techniques to incorporate if sleep continues to be interrupted. Educated on anxiety coping skills to assist the medication and allow for less racing thoughts. During this session, this clinician used the following therapeutic modalities: Cognitive Behavioral Therapy and Dialectical Behavior Therapy    Substance Abuse was not addressed during this session. If the client is diagnosed with a co-occurring substance use disorder, please indicate any changes in the frequency or amount of use: n/a.  Stage of change for addressing substance use diagnoses: No substance use/Not applicable    ASSESSMENT:  Ingrid Jacobs presents with a Anxious mood. his affect is Normal range and intensity, which is congruent, with his mood and the content of the session. The client has made progress on their goals. Alpha Likens presented as engaged and insightful. Seemed to be guarded and limited his sharing about different experiences. Ingrid Jacobs presents with a minimal risk of suicide, minimal risk of self-harm, and none risk of harm to others. For any risk assessment that surpasses a "low" rating, a safety plan must be developed. A safety plan was indicated: no  If yes, describe in detail     PLAN: Between sessions, Ingrid Jacobs will continue with medications, incorporate sleep techniques and utilize coping skills for anxiety. At the next session, the therapist will use Dialectical Behavior Therapy, Mindfulness-based Strategies, and Motivational Interviewing to address progress in decreasing anxiety. Behavioral Health Treatment Plan and Discharge Planning: Ingrid Jacobs is aware of and agrees to continue to work on their treatment plan. They have identified and are working toward their discharge goals.  yes    Visit start and stop times:    11/02/23  Start Time: 0907  Stop Time: 0951  Total Visit Time: 44 minutes

## 2023-11-10 ENCOUNTER — SOCIAL WORK (OUTPATIENT)
Age: 33
End: 2023-11-10
Payer: COMMERCIAL

## 2023-11-10 DIAGNOSIS — F41.1 GENERALIZED ANXIETY DISORDER: Primary | ICD-10-CM

## 2023-11-10 PROCEDURE — 90834 PSYTX W PT 45 MINUTES: CPT

## 2023-11-10 NOTE — PSYCH
Behavioral Health Psychotherapy Progress Note    Psychotherapy Provided: Individual Psychotherapy     1. Generalized anxiety disorder            Goals addressed in session: Goal 1     DATA: Katie Nettles arrived for his in person session. He shared that he continued to feel improvements with the medications. He denied any side effect. He reported being able to release the anxious thoughts more without them lingering or causing physical sensations. He reported that he father is doing well. He is getting further in his work projects due to having returned energy that he felt was gone before. He shared about his experiences with his brother and how he found his brother after he hung himself. He recognized that much of his anxiety comes from worry that worst case scenario is going to happen. Acknowledged this had happened to him several times. Once with his brother, once after he found his father after he overdosed and his worry about his mom and her drug addiction. He felt that he had to deal with these experiences alone because his parents  and his brother . Educated on trauma responses and triggers. During this session, this clinician used the following therapeutic modalities: Client-centered Therapy and Cognitive Behavioral Therapy    Substance Abuse was not addressed during this session. If the client is diagnosed with a co-occurring substance use disorder, please indicate any changes in the frequency or amount of use: n/a. Stage of change for addressing substance use diagnoses: No substance use/Not applicable    ASSESSMENT:  Yareli Neely presents with a Anxious mood. his affect is Normal range and intensity, which is congruent, with his mood and the content of the session. The client has made progress on their goals. Katie Nettles presented as calm and engaged. He reported decreased anxiety and ability to manage racing thoughts.  Yareli Neely presents with a minimal risk of suicide, minimal risk of self-harm, and none risk of harm to others. For any risk assessment that surpasses a "low" rating, a safety plan must be developed. A safety plan was indicated: no  If yes, describe in detail     PLAN: Between sessions, Carin Moran will continue with medications increasing to 50 mg and track if there are any side effects. At the next session, the therapist will use Solution-Focused Therapy to address lingering anxiety or questions. Behavioral Health Treatment Plan and Discharge Planning: Carin Moran is aware of and agrees to continue to work on their treatment plan. They have identified and are working toward their discharge goals.  yes    Visit start and stop times:    11/10/23  Start Time: 0900  Stop Time: 0947  Total Visit Time: 47 minutes

## 2023-11-11 DIAGNOSIS — F41.9 ANXIETY AND DEPRESSION: ICD-10-CM

## 2023-11-11 DIAGNOSIS — F32.A ANXIETY AND DEPRESSION: ICD-10-CM

## 2023-11-17 ENCOUNTER — OFFICE VISIT (OUTPATIENT)
Dept: FAMILY MEDICINE CLINIC | Facility: CLINIC | Age: 33
End: 2023-11-17
Payer: COMMERCIAL

## 2023-11-17 VITALS
WEIGHT: 197.8 LBS | OXYGEN SATURATION: 98 % | SYSTOLIC BLOOD PRESSURE: 122 MMHG | DIASTOLIC BLOOD PRESSURE: 76 MMHG | HEART RATE: 85 BPM | HEIGHT: 69 IN | TEMPERATURE: 97.5 F | BODY MASS INDEX: 29.3 KG/M2 | RESPIRATION RATE: 18 BRPM

## 2023-11-17 DIAGNOSIS — F32.A ANXIETY AND DEPRESSION: ICD-10-CM

## 2023-11-17 DIAGNOSIS — F41.9 ANXIETY AND DEPRESSION: ICD-10-CM

## 2023-11-17 PROCEDURE — 99213 OFFICE O/P EST LOW 20 MIN: CPT | Performed by: PHYSICIAN ASSISTANT

## 2023-11-17 NOTE — ASSESSMENT & PLAN NOTE
Doing better with medication, continue same. RTO 3 months or sooner PRN. Pt to continue going to therapy.

## 2023-11-17 NOTE — PROGRESS NOTES
Name: Kelsey Lindo      : 1990      MRN: [id number]  Encounter Provider: Rowan Gray PA-C  Encounter Date: 2023   Encounter department: [address]     1. Anxiety and depression  Assessment & Plan:  Doing better with medication, continue same. RTO 3 months or sooner PRN. Pt to continue going to therapy. Orders:  -     sertraline (ZOLOFT) 50 mg tablet; Take 1 tablet (50 mg total) by mouth daily           Subjective      Debbie Fowler is here today for 1 month recheck for anxiety/depression. States when took first dose of sertraline 50 mg, did not like "how he felt." He stopped taking it. When he saw therapist, she suggested he take half tab daily x 2 weeks then increase to full tab. He's done that and is doing well, has now been taking 50 mg daily x 1 week. Feels > 50% improvement. Review of Systems   Constitutional:  Negative for activity change, appetite change, chills, diaphoresis, fatigue, fever and unexpected weight change. HENT:  Negative for congestion, ear pain, postnasal drip, rhinorrhea, sinus pressure, sinus pain, sneezing, sore throat, tinnitus and voice change. Eyes:  Negative for pain, redness and visual disturbance. Respiratory:  Negative for cough, chest tightness, shortness of breath and wheezing. Cardiovascular:  Negative for chest pain, palpitations and leg swelling. Gastrointestinal:  Negative for abdominal pain, blood in stool, constipation, diarrhea, nausea and vomiting. Genitourinary:  Negative for difficulty urinating, dysuria, frequency, hematuria and urgency. Musculoskeletal:  Negative for arthralgias, back pain, gait problem, joint swelling, myalgias, neck pain and neck stiffness. Skin:  Negative for color change, pallor, rash and wound. Neurological:  Negative for dizziness, tremors, weakness, light-headedness and headaches.    Psychiatric/Behavioral:  Negative for dysphoric mood, self-injury, sleep disturbance and suicidal ideas. The patient is not nervous/anxious. Current Outpatient Medications on File Prior to Visit   Medication Sig   • fluticasone (FLONASE) 50 mcg/act nasal spray 2 sprays into each nostril daily   • fluticasone (FLONASE) 50 mcg/act nasal spray 2 sprays into each nostril daily   • [DISCONTINUED] sertraline (ZOLOFT) 50 mg tablet TAKE 1 TABLET BY MOUTH EVERY DAY   • [DISCONTINUED] imiquimod (ALDARA) 5 % cream Apply 1 packet topically 2 (two) times a week (Patient not taking: Reported on 11/17/2023)       Objective     /76   Pulse 85   Temp 97.5 °F (36.4 °C) (Temporal)   Resp 18   Ht 5' 9" (1.753 m)   Wt 89.7 kg (197 lb 12.8 oz)   SpO2 98%   BMI 29.21 kg/m²     Physical Exam  Vitals reviewed. Constitutional:       General: He is not in acute distress. Appearance: He is well-developed. He is not diaphoretic. HENT:      Head: Normocephalic and atraumatic. Right Ear: Hearing, tympanic membrane, ear canal and external ear normal.      Left Ear: Hearing, tympanic membrane, ear canal and external ear normal.      Nose: Nose normal.      Mouth/Throat:      Mouth: Mucous membranes are moist.      Pharynx: Oropharynx is clear. Uvula midline. No oropharyngeal exudate. Eyes:      General: No scleral icterus. Right eye: No discharge. Left eye: No discharge. Conjunctiva/sclera: Conjunctivae normal.   Neck:      Thyroid: No thyromegaly. Vascular: No carotid bruit. Cardiovascular:      Rate and Rhythm: Normal rate and regular rhythm. Heart sounds: Normal heart sounds. No murmur heard. Pulmonary:      Effort: Pulmonary effort is normal. No respiratory distress. Breath sounds: Normal breath sounds. No wheezing. Abdominal:      General: Bowel sounds are normal. There is no distension. Palpations: Abdomen is soft. There is no mass. Tenderness: There is no abdominal tenderness. There is no guarding or rebound.    Musculoskeletal: General: No tenderness. Normal range of motion. Cervical back: Neck supple. Lymphadenopathy:      Cervical: No cervical adenopathy. Skin:     General: Skin is warm and dry. Findings: No erythema or rash. Neurological:      Mental Status: He is alert and oriented to person, place, and time. Psychiatric:         Behavior: Behavior normal.         Thought Content:  Thought content normal.         Judgment: Judgment normal.       Jasper Stein PA-C

## 2023-11-30 ENCOUNTER — SOCIAL WORK (OUTPATIENT)
Age: 33
End: 2023-11-30
Payer: COMMERCIAL

## 2023-11-30 DIAGNOSIS — F41.1 GENERALIZED ANXIETY DISORDER: Primary | ICD-10-CM

## 2023-11-30 PROCEDURE — 90834 PSYTX W PT 45 MINUTES: CPT

## 2023-11-30 NOTE — PSYCH
Behavioral Health Psychotherapy Progress Note    Psychotherapy Provided: Individual Psychotherapy     1. Generalized anxiety disorder            Goals addressed in session: Goal 1     DATA: Mercy Grossman arrived for his in person session. He reported that things were going well. He discussed his holiday and how he went to [place] to visit his father's side of the family. He stated he felt like his medications were working. He said, last week was the first week that I could see the full improvements. I saw the reduced anxiety but last week I felt good. He stated that he reached out to the . He will get resume, interview and job search coaching. He was hopeful that this would help point him in the direction of which jobs to apply for and feel confident. He stated his dad causes him stress because he spends his money on random scam things. He tried to manage his father's money but his father continued to spend his money without changing his behaviors. During this session, this clinician used the following therapeutic modalities: Solution-Focused Therapy and Supportive Psychotherapy    Substance Abuse was not addressed during this session. If the client is diagnosed with a co-occurring substance use disorder, please indicate any changes in the frequency or amount of use: n/a. Stage of change for addressing substance use diagnoses: No substance use/Not applicable    ASSESSMENT:  Ady Carranza presents with a Euthymic/ normal mood. his affect is Normal range and intensity, which is congruent, with his mood and the content of the session. The client has made progress on their goals. Mercy Grossman presented as engaged and balanced. He presented with less anxiety and reported being able to manage it much better than before. Ady Carranza presents with a minimal risk of suicide, minimal risk of self-harm, and none risk of harm to others.     For any risk assessment that surpasses a "low" rating, a safety plan must be developed. A safety plan was indicated: no  If yes, describe in detail     PLAN: Follow up in two weeks. Between sessions, Yareli Neely will continue with medication and coping skills. At the next session, the therapist will use Solution-Focused Therapy and Supportive Psychotherapy to address new challenges that arrise. Behavioral Health Treatment Plan and Discharge Planning: Yareli Neely is aware of and agrees to continue to work on their treatment plan. They have identified and are working toward their discharge goals.  yes    Visit start and stop times:    11/30/23  Start Time: 0903  Stop Time: 0941  Total Visit Time: 38 minutes

## 2023-12-14 ENCOUNTER — SOCIAL WORK (OUTPATIENT)
Age: 33
End: 2023-12-14
Payer: COMMERCIAL

## 2023-12-14 DIAGNOSIS — F41.1 GENERALIZED ANXIETY DISORDER: Primary | ICD-10-CM

## 2023-12-14 PROCEDURE — 90832 PSYTX W PT 30 MINUTES: CPT

## 2023-12-14 NOTE — PSYCH
Behavioral Health Psychotherapy Progress Note    Psychotherapy Provided: Individual Psychotherapy     1. Generalized anxiety disorder            Goals addressed in session: Goal 1     DATA: Mercy Grossman arrived for his in person session. He shared that things have improved and continue to go well. He met with the  and is hopeful for positive outcomes. He explained that he may have to go back to school to finish his 4 year degree, which he is willing to do once he gets a steady income. 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 He shared that he attempts to have conversations with his friend about closing the business, but his friend will tell him he is being too negative. He recognized that his strength of being able to think through scenarios could be interpreted as being negative. 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 During this session, this clinician used the following therapeutic modalities: Client-centered Therapy    Substance Abuse was not addressed during this session. If the client is diagnosed with a co-occurring substance use disorder, please indicate any changes in the frequency or amount of use: n/a. Stage of change for addressing substance use diagnoses: No substance use/Not applicable    ASSESSMENT:  Ady Carranza presents with a Euthymic/ normal mood. his affect is Normal range and intensity, which is congruent, with his mood and the content of the session. The client has made progress on their goals. Mercy Grossman presented as engaged and insightful. He reported significant improvements in the way he is able to manage stressful situations. Ady Carranza presents with a none risk of suicide, minimal risk of self-harm, and none risk of harm to others.     For any risk assessment that surpasses a "low" rating, a safety plan must be developed. A safety plan was indicated: no  If yes, describe in detail     PLAN: Between sessions, Johnson Clayton will continue to work with the  and plan to look for jobs in the new year. At the next session, the therapist will use Client-centered Therapy to address managing new stressful situations. Behavioral Health Treatment Plan and Discharge Planning: Johnson Clayotn is aware of and agrees to continue to work on their treatment plan. They have identified and are working toward their discharge goals.  yes    Visit start and stop times:    12/14/23  Start Time: 1010  Stop Time: 1041  Total Visit Time: 31 minutes

## 2024-01-05 ENCOUNTER — SOCIAL WORK (OUTPATIENT)
Age: 34
End: 2024-01-05
Payer: COMMERCIAL

## 2024-01-05 DIAGNOSIS — F41.1 GENERALIZED ANXIETY DISORDER: Primary | ICD-10-CM

## 2024-01-05 PROCEDURE — 90832 PSYTX W PT 30 MINUTES: CPT

## 2024-01-05 NOTE — PSYCH
"Behavioral Health Psychotherapy Progress Note    Psychotherapy Provided: Individual Psychotherapy     1. Generalized anxiety disorder            Goals addressed in session: Goal 1     DATA: Glenn arrived for his in person session. He shared that things have been going well and his holiday was relaxing. He decided to stay home instead of traveling to [place] because he was busy with his work and wanted the time to relax.  He shared that he applied for a few jobs that he was excited for. He was nervous about telling his  that they would be \"giving up\" the business.  Worked on reframing the idea that they aren't giving up on the dreams they are just making decisions that are most beneficial for him financially.  He is confident that he will find a job that will allow him to do the business as a side job while still having a steady income.  Reported medications have been good and he continued to have reduced physical sensations to the anxiety.    During this session, this clinician used the following therapeutic modalities: Motivational Interviewing and Supportive Psychotherapy    Substance Abuse was not addressed during this session. If the client is diagnosed with a co-occurring substance use disorder, please indicate any changes in the frequency or amount of use: n/a. Stage of change for addressing substance use diagnoses: No substance use/Not applicable    ASSESSMENT:  Glenn Garcia presents with a Euthymic/ normal mood.     his affect is Normal range and intensity, which is congruent, with his mood and the content of the session. The client has made progress on their goals.    Glenn presented as engaged and relaxed. Glenn Garcia presents with a none risk of suicide, none risk of self-harm, and none risk of harm to others.    For any risk assessment that surpasses a \"low\" rating, a safety plan must be developed.    A safety plan was indicated: no  If yes, describe in detail     PLAN: Between " sessions, Glenn Garcia will continue to work with the  and apply for jobs. At the next session, the therapist will use Client-centered Therapy and Supportive Psychotherapy to address building confidence for interviews.    Behavioral Health Treatment Plan and Discharge Planning: Glenn Garcia is aware of and agrees to continue to work on their treatment plan. They have identified and are working toward their discharge goals. yes\    Visit start and stop times:    01/05/24  Start Time: 1005  Stop Time: 1039  Total Visit Time: 34 minutes

## 2024-01-26 ENCOUNTER — OFFICE VISIT (OUTPATIENT)
Dept: FAMILY MEDICINE CLINIC | Facility: CLINIC | Age: 34
End: 2024-01-26
Payer: COMMERCIAL

## 2024-01-26 ENCOUNTER — SOCIAL WORK (OUTPATIENT)
Age: 34
End: 2024-01-26
Payer: COMMERCIAL

## 2024-01-26 VITALS
RESPIRATION RATE: 18 BRPM | WEIGHT: 201 LBS | SYSTOLIC BLOOD PRESSURE: 124 MMHG | BODY MASS INDEX: 29.77 KG/M2 | HEART RATE: 88 BPM | OXYGEN SATURATION: 98 % | HEIGHT: 69 IN | DIASTOLIC BLOOD PRESSURE: 80 MMHG | TEMPERATURE: 97.7 F

## 2024-01-26 DIAGNOSIS — F41.1 GENERALIZED ANXIETY DISORDER: Primary | ICD-10-CM

## 2024-01-26 DIAGNOSIS — F32.A ANXIETY AND DEPRESSION: ICD-10-CM

## 2024-01-26 DIAGNOSIS — T48.5X5A RHINITIS MEDICAMENTOSA: Primary | ICD-10-CM

## 2024-01-26 DIAGNOSIS — F41.9 ANXIETY AND DEPRESSION: ICD-10-CM

## 2024-01-26 DIAGNOSIS — J31.0 RHINITIS MEDICAMENTOSA: Primary | ICD-10-CM

## 2024-01-26 DIAGNOSIS — J30.1 ALLERGIC RHINITIS DUE TO POLLEN, UNSPECIFIED SEASONALITY: ICD-10-CM

## 2024-01-26 PROCEDURE — 90832 PSYTX W PT 30 MINUTES: CPT

## 2024-01-26 PROCEDURE — 99213 OFFICE O/P EST LOW 20 MIN: CPT | Performed by: PHYSICIAN ASSISTANT

## 2024-01-26 RX ORDER — FLUTICASONE PROPIONATE 50 MCG
2 SPRAY, SUSPENSION (ML) NASAL DAILY
Qty: 16 G | Refills: 11 | Status: SHIPPED | OUTPATIENT
Start: 2024-01-26

## 2024-01-26 RX ORDER — PREDNISONE 10 MG/1
TABLET ORAL
Qty: 20 TABLET | Refills: 0 | Status: SHIPPED | OUTPATIENT
Start: 2024-01-26

## 2024-01-26 RX ORDER — AZELASTINE HYDROCHLORIDE 0.5 MG/ML
SOLUTION/ DROPS OPHTHALMIC
COMMUNITY
Start: 2024-01-24

## 2024-01-26 NOTE — PSYCH
Behavioral Health Psychotherapy Progress Note    Psychotherapy Provided: Individual Psychotherapy     1. Generalized anxiety disorder            Goals addressed in session: Goal 1     DATA: Glenn arrived for his in person session. He shared that things have been going really well.  He reported that he was hired for a new job, which he is excited for. He shared that this new job is everything that he was looking for. It's close to home, good pay,  and flexible to still be able to take his dad to appointments.  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  The friend shared that he agreed and was waiting for Glenn to say something. Both interviewed for jobs and received offers.  Anxiety is managed and has not noticed any changes on dose of medication.  Discussed potential for discharge if new work schedule doesn't work with therapy schedule.  He was agreeable. Wanted to start his new job and figure out his schedule before scheduling follow up.      During this session, this clinician used the following therapeutic modalities: Supportive Psychotherapy    Substance Abuse was not addressed during this session. If the client is diagnosed with a co-occurring substance use disorder, please indicate any changes in the frequency or amount of use: n/a. Stage of change for addressing substance use diagnoses: No substance use/Not applicable    ASSESSMENT:  Glenn Garcia presents with a Euthymic/ normal mood.     his affect is Normal range and intensity, which is congruent, with his mood and the content of the session. The client has made progress on their goals.    Glenn presented as engaged and hopeful. He expressed his excitement for this new opportunity. Glenn Garcia presents with a none risk of suicide, none risk of self-harm, and none risk of harm to others.    For any  "risk assessment that surpasses a \"low\" rating, a safety plan must be developed.    A safety plan was indicated: no  If yes, describe in detail     PLAN: Between sessions, Glenn Garcia will start at his new job. At the next session, the therapist will use Mindfulness-based Strategies to address if any anxiety reoccurs after starting new job.    Behavioral Health Treatment Plan and Discharge Planning: Glenn Garcia is aware of and agrees to continue to work on their treatment plan. They have identified and are working toward their discharge goals. yes    Visit start and stop times:    01/26/24  Start Time: 1048  Stop Time: 1109  Total Visit Time: 21 minutes  "

## 2024-01-26 NOTE — PROGRESS NOTES
Name: Toni Garcia      : 1990      MRN: [id number]  Encounter Provider: Lucy Schumacher PA-C  Encounter Date: 2024   Encounter department: [facility]    Assessment & Plan     1. Rhinitis medicamentosa  Assessment & Plan:  Avoid Afrin. Continue using Flonase. Will also give prednisone taper to take until finished.    Orders:  -     predniSONE 10 mg tablet; Days 1 and 2 take 4 tablets daily, days 3 and 4 take 3 tablets daily, days 5 and 6 and 2 tablets daily, days 7 and 8 take 1 tablet daily.    2. Allergic rhinitis due to pollen, unspecified seasonality  Assessment & Plan:  Continue to use Flonase NS. Avoid Afrin. Can take OTC anti-histimine.     Orders:  -     fluticasone (FLONASE) 50 mcg/act nasal spray; 2 sprays into each nostril daily    3. Anxiety and depression  Assessment & Plan:  Stable, continue sertraline. No refill needed at this time.        Depression Screening and Follow-up Plan: Patient was screened for depression during today's encounter. They screened negative with a PHQ-9 score of 0.        Tru Elizabeth is here today for follow up.  Doing well with sertraline, anxiety and depression are controlled.  He is having some chronic congestion, has used Afrin long-term and now having some difficulty stopping use. He is using Flonase.      Review of Systems   Constitutional:  Negative for chills and fever.   HENT:  Positive for congestion. Negative for ear pain and sore throat.    Eyes:  Negative for pain and visual disturbance.   Respiratory:  Negative for cough and shortness of breath.    Cardiovascular:  Negative for chest pain and palpitations.   Gastrointestinal:  Negative for abdominal pain and vomiting.   Genitourinary:  Negative for dysuria and hematuria.   Musculoskeletal:  Negative for arthralgias and back pain.   Skin:  Negative for color change and rash.   Neurological:  Negative for seizures and syncope.   All other systems reviewed and are  "negative.      Current Outpatient Medications on File Prior to Visit   Medication Sig   • azelastine (OPTIVAR) 0.05 % ophthalmic solution INSTILL 1 DROP INTO AFFECTED EYE(S) EVERY DAY   • sertraline (ZOLOFT) 50 mg tablet Take 1 tablet (50 mg total) by mouth daily   • [DISCONTINUED] fluticasone (FLONASE) 50 mcg/act nasal spray 2 sprays into each nostril daily   • [DISCONTINUED] fluticasone (FLONASE) 50 mcg/act nasal spray 2 sprays into each nostril daily       Objective     /80 (BP Location: Left arm, Patient Position: Sitting, Cuff Size: Large)   Pulse 88   Temp 97.7 °F (36.5 °C) (Temporal)   Resp 18   Ht 5' 9\" (1.753 m)   Wt 91.2 kg (201 lb)   SpO2 98%   BMI 29.68 kg/m²     Physical Exam  Vitals reviewed.   Constitutional:       General: He is not in acute distress.     Appearance: He is well-developed. He is not diaphoretic.   HENT:      Head: Normocephalic and atraumatic.      Right Ear: Hearing, tympanic membrane, ear canal and external ear normal.      Left Ear: Hearing, tympanic membrane, ear canal and external ear normal.      Nose: Congestion present.      Mouth/Throat:      Mouth: Mucous membranes are moist.      Pharynx: Oropharynx is clear. Uvula midline. No oropharyngeal exudate.   Eyes:      General: No scleral icterus.        Right eye: No discharge.         Left eye: No discharge.      Conjunctiva/sclera: Conjunctivae normal.   Neck:      Thyroid: No thyromegaly.      Vascular: No carotid bruit.   Cardiovascular:      Rate and Rhythm: Normal rate and regular rhythm.      Heart sounds: Normal heart sounds. No murmur heard.  Pulmonary:      Effort: Pulmonary effort is normal. No respiratory distress.      Breath sounds: Normal breath sounds. No wheezing.   Abdominal:      General: Bowel sounds are normal. There is no distension.      Palpations: Abdomen is soft. There is no mass.      Tenderness: There is no abdominal tenderness. There is no guarding or rebound.   Musculoskeletal:         " General: No tenderness. Normal range of motion.      Cervical back: Neck supple.   Lymphadenopathy:      Cervical: No cervical adenopathy.   Skin:     General: Skin is warm and dry.      Findings: No erythema or rash.   Neurological:      Mental Status: He is alert and oriented to person, place, and time.   Psychiatric:         Behavior: Behavior normal.         Thought Content: Thought content normal.         Judgment: Judgment normal.       Lucy Schumacher PA-C

## 2024-02-17 DIAGNOSIS — J30.1 ALLERGIC RHINITIS DUE TO POLLEN, UNSPECIFIED SEASONALITY: ICD-10-CM

## 2024-02-19 RX ORDER — FLUTICASONE PROPIONATE 50 MCG
SPRAY, SUSPENSION (ML) NASAL
Qty: 48 ML | Refills: 4 | Status: SHIPPED | OUTPATIENT
Start: 2024-02-19

## 2024-04-29 DIAGNOSIS — Z00.6 ENCOUNTER FOR EXAMINATION FOR NORMAL COMPARISON OR CONTROL IN CLINICAL RESEARCH PROGRAM: ICD-10-CM

## 2024-04-30 ENCOUNTER — OFFICE VISIT (OUTPATIENT)
Dept: FAMILY MEDICINE CLINIC | Facility: CLINIC | Age: 34
End: 2024-04-30
Payer: COMMERCIAL

## 2024-04-30 VITALS
BODY MASS INDEX: 29.83 KG/M2 | HEIGHT: 69 IN | DIASTOLIC BLOOD PRESSURE: 62 MMHG | WEIGHT: 201.4 LBS | SYSTOLIC BLOOD PRESSURE: 118 MMHG | HEART RATE: 73 BPM | OXYGEN SATURATION: 98 %

## 2024-04-30 DIAGNOSIS — F41.9 ANXIETY AND DEPRESSION: ICD-10-CM

## 2024-04-30 DIAGNOSIS — F32.A ANXIETY AND DEPRESSION: ICD-10-CM

## 2024-04-30 PROCEDURE — 99213 OFFICE O/P EST LOW 20 MIN: CPT | Performed by: PHYSICIAN ASSISTANT

## 2024-04-30 NOTE — PROGRESS NOTES
Name: Toni Garcia      : 1990      MRN: [id number]  Encounter Provider: Lucy Schumacher PA-C  Encounter Date: 2024   Encounter department: [facility]    Assessment & Plan     1. Anxiety and depression  Assessment & Plan:  Stable, continue same. Refill given, RTO 6 months or sooner PRN.    Orders:  -     sertraline (ZOLOFT) 50 mg tablet; Take 1 tablet (50 mg total) by mouth daily           Subjective      Glenn returns today for anxiety/depression follow up.  He remains stable, is doing well. Has not been to talk-therapy for a little while, states he is meaning to schedule another appointment.      Review of Systems   Constitutional:  Negative for activity change, appetite change, chills, diaphoresis, fatigue, fever and unexpected weight change.   HENT:  Negative for congestion, ear pain, postnasal drip, rhinorrhea, sinus pressure, sinus pain, sneezing, sore throat, tinnitus and voice change.    Eyes:  Negative for pain, redness and visual disturbance.   Respiratory:  Negative for cough, chest tightness, shortness of breath and wheezing.    Cardiovascular:  Negative for chest pain, palpitations and leg swelling.   Gastrointestinal:  Negative for abdominal pain, blood in stool, constipation, diarrhea, nausea and vomiting.   Genitourinary:  Negative for difficulty urinating, dysuria, frequency, hematuria and urgency.   Musculoskeletal:  Negative for arthralgias, back pain, gait problem, joint swelling, myalgias, neck pain and neck stiffness.   Skin:  Negative for color change, pallor, rash and wound.   Neurological:  Negative for dizziness, tremors, weakness, light-headedness and headaches.   Psychiatric/Behavioral:  Negative for dysphoric mood, self-injury, sleep disturbance and suicidal ideas. The patient is not nervous/anxious.        Current Outpatient Medications on File Prior to Visit   Medication Sig    azelastine (OPTIVAR) 0.05 % ophthalmic solution INSTILL 1 DROP INTO AFFECTED  "EYE(S) EVERY DAY    fluticasone (FLONASE) 50 mcg/act nasal spray SPRAY 2 SPRAYS INTO EACH NOSTRIL EVERY DAY    [DISCONTINUED] sertraline (ZOLOFT) 50 mg tablet Take 1 tablet (50 mg total) by mouth daily    [DISCONTINUED] predniSONE 10 mg tablet Days 1 and 2 take 4 tablets daily, days 3 and 4 take 3 tablets daily, days 5 and 6 and 2 tablets daily, days 7 and 8 take 1 tablet daily. (Patient not taking: Reported on 4/30/2024)       Objective     /62   Pulse 73   Ht 5' 9\" (1.753 m)   Wt 91.4 kg (201 lb 6.4 oz)   SpO2 98%   BMI 29.74 kg/m²     Physical Exam  Vitals reviewed.   Constitutional:       General: He is not in acute distress.     Appearance: He is well-developed. He is not diaphoretic.   HENT:      Head: Normocephalic and atraumatic.      Right Ear: Hearing, tympanic membrane, ear canal and external ear normal.      Left Ear: Hearing, tympanic membrane, ear canal and external ear normal.      Nose: Nose normal.      Mouth/Throat:      Mouth: Mucous membranes are moist.      Pharynx: Oropharynx is clear. Uvula midline. No oropharyngeal exudate.   Eyes:      General: No scleral icterus.        Right eye: No discharge.         Left eye: No discharge.      Conjunctiva/sclera: Conjunctivae normal.   Neck:      Thyroid: No thyromegaly.      Vascular: No carotid bruit.   Cardiovascular:      Rate and Rhythm: Normal rate and regular rhythm.      Heart sounds: Normal heart sounds. No murmur heard.  Pulmonary:      Effort: Pulmonary effort is normal. No respiratory distress.      Breath sounds: Normal breath sounds. No wheezing.   Abdominal:      General: Bowel sounds are normal. There is no distension.      Palpations: Abdomen is soft. There is no mass.      Tenderness: There is no abdominal tenderness. There is no guarding or rebound.   Musculoskeletal:         General: No tenderness. Normal range of motion.      Cervical back: Neck supple.   Lymphadenopathy:      Cervical: No cervical adenopathy.   Skin:     " General: Skin is warm and dry.      Findings: No erythema or rash.   Neurological:      Mental Status: He is alert and oriented to person, place, and time.   Psychiatric:         Behavior: Behavior normal.         Thought Content: Thought content normal.         Judgment: Judgment normal.       Lucy Schumacher PA-C

## 2024-06-12 ENCOUNTER — DOCUMENTATION (OUTPATIENT)
Age: 34
End: 2024-06-12

## 2024-06-12 DIAGNOSIS — F41.1 GENERALIZED ANXIETY DISORDER: Primary | ICD-10-CM

## 2024-06-12 NOTE — PROGRESS NOTES
"Psychotherapy Discharge Summary    Preferred Name: Glenn Garcia  YOB: 1990    Admission date to psychotherapy: 10/13/23    Referred by: Dr. Lucy Schumacher - PCP     Presenting Problem: pulled from intake:   Glenn reported the main concerns were being tired, lots of anxiety and lack of energy and motivation. He shared that his anxiety is presented as worrying, thinking negatively, pacing - walking around not doing anything, difficulty sitting still, difficulty concentrating, racing thoughts, bad habit of not finishing things and difficult controlling thoughts. He reported that his sleep is good, but not feeling rested. \"I can do things but then after an hour or so I get wiped out\". No significant changes to appetite He reported ADLs as limited difficulties. He finds that he cleans to stop from worrying. \"If I sit I get anxiety\". He reported having anxiety since high school after brother  by suicide. He didn't notice the worsening anxiousness until a few years after the brother's death. The worst anxiety started a few years ago during Covid. He stated it was \"worst was last fall because of business - filing bankruptcy\".  He reported current stressors are career change, recent relationship ending, unmanaged history of mental health issues, finances and being involved in a lot.       He stated he attended 2 semesters at [employer] and then moved back to go to [employer]. 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 He has a associates degree mechanical engineering. He thinks he will end up going back to school to allow for more opportunities in the job market. He reported that money and time are what's holding him back     Denied suicidal ideation, HI, SIB - \"I always look toward the future. 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     Treatment Complications (if any): None    Treatment Progress: good    Current [facility] Provider: None    Discharge Medications include:   Current Outpatient Medications on File Prior to Visit   Medication Sig Dispense Refill    azelastine (OPTIVAR) 0.05 % ophthalmic solution INSTILL 1 DROP INTO AFFECTED EYE(S) EVERY DAY      fluticasone (FLONASE) 50 mcg/act nasal spray SPRAY 2 SPRAYS INTO EACH NOSTRIL EVERY DAY 48 mL 4    sertraline (ZOLOFT) 50 mg tablet Take 1 tablet (50 mg total) by mouth daily 90 tablet 1     No current facility-administered medications on file prior to visit.        Discharge Date: 06/12/24     Discharge Diagnosis:   1. Generalized anxiety disorder            Criteria for Discharge: Received new job and was unsure of schedule, kept open for a few months but this provider is leaving the practice.  Eligible for transfer, if desired    Aftercare recommendations include (include specific referral names and phone numbers, if appropriate):     Prognosis: good

## 2024-06-18 ENCOUNTER — OFFICE VISIT (OUTPATIENT)
Dept: FAMILY MEDICINE CLINIC | Facility: CLINIC | Age: 34
End: 2024-06-18
Payer: COMMERCIAL

## 2024-06-18 VITALS
HEART RATE: 78 BPM | TEMPERATURE: 97.6 F | SYSTOLIC BLOOD PRESSURE: 126 MMHG | WEIGHT: 201.6 LBS | BODY MASS INDEX: 29.86 KG/M2 | DIASTOLIC BLOOD PRESSURE: 78 MMHG | HEIGHT: 69 IN | OXYGEN SATURATION: 98 %

## 2024-06-18 DIAGNOSIS — R10.12 LUQ PAIN: Primary | ICD-10-CM

## 2024-06-18 PROCEDURE — 99213 OFFICE O/P EST LOW 20 MIN: CPT | Performed by: PHYSICIAN ASSISTANT

## 2024-06-18 NOTE — PROGRESS NOTES
"Ambulatory Visit  Name: Toni Garcia      : 1990      MRN: [id number]  Encounter Provider: Lucy Schumacher PA-C  Encounter Date: 2024   Encounter department: [facility]    Assessment & Plan   1. LUQ pain  Assessment & Plan:  Pain not reproducible on exam however pt states along bottom edge of L anterior ribs/LUQ pain. Will check US to evaluate spleen, otherwise likely costochondritis, pt to try improving posture since he has poor posture and is sitting a lot with his new job.  Orders:  -     US left upper quadrant; Future; Expected date: 2024       History of Present Illness     Glenn is here today complaining of pain in LUQ/L lower ribs anteriorly x 1 month. Denies known injury. Cannot notice any pattern to the pain. Reports it is more of a discomfort, ranks at 2/10. Currently is asymptomatic. He started a new job where he is sitting more, he does admit to having poor posture. Has not needed to take any OTC pain reliever.        Review of Systems   Constitutional:  Negative for chills and fever.   HENT:  Negative for ear pain and sore throat.    Eyes:  Negative for pain and visual disturbance.   Respiratory:  Negative for cough and shortness of breath.    Cardiovascular:  Negative for chest pain and palpitations.   Gastrointestinal:  Positive for abdominal pain. Negative for vomiting.   Genitourinary:  Negative for dysuria and hematuria.   Musculoskeletal:  Negative for arthralgias and back pain.   Skin:  Negative for color change and rash.   Neurological:  Negative for seizures and syncope.   All other systems reviewed and are negative.      Objective     /78   Pulse 78   Temp 97.6 °F (36.4 °C)   Ht 5' 9\" (1.753 m)   Wt 91.4 kg (201 lb 9.6 oz)   SpO2 98%   BMI 29.77 kg/m²     Physical Exam  Vitals reviewed.   Constitutional:       General: He is not in acute distress.     Appearance: He is well-developed. He is not diaphoretic.   HENT:      Head: Normocephalic and " atraumatic.      Right Ear: External ear normal.      Left Ear: External ear normal.      Nose: Nose normal.      Mouth/Throat:      Pharynx: No oropharyngeal exudate.   Eyes:      General: No scleral icterus.        Right eye: No discharge.         Left eye: No discharge.      Conjunctiva/sclera: Conjunctivae normal.   Neck:      Thyroid: No thyromegaly.      Vascular: No carotid bruit or JVD.      Trachea: No tracheal deviation.   Cardiovascular:      Rate and Rhythm: Normal rate and regular rhythm.      Heart sounds: Normal heart sounds. No murmur heard.  Pulmonary:      Effort: Pulmonary effort is normal. No respiratory distress.      Breath sounds: Normal breath sounds. No wheezing.   Abdominal:      General: Bowel sounds are normal. There is no distension.      Palpations: Abdomen is soft.      Tenderness: There is no abdominal tenderness.       Musculoskeletal:         General: No tenderness or deformity. Normal range of motion.      Cervical back: Normal range of motion and neck supple.   Lymphadenopathy:      Cervical: No cervical adenopathy.   Skin:     General: Skin is warm and dry.      Capillary Refill: Capillary refill takes less than 2 seconds.      Coloration: Skin is not pale.      Findings: No erythema or rash.   Neurological:      Mental Status: He is alert and oriented to person, place, and time.   Psychiatric:         Behavior: Behavior normal.         Thought Content: Thought content normal.         Judgment: Judgment normal.       Administrative Statements

## 2024-06-18 NOTE — ASSESSMENT & PLAN NOTE
Pain not reproducible on exam however pt states along bottom edge of L anterior ribs/LUQ pain. Will check US to evaluate spleen, otherwise likely costochondritis, pt to try improving posture since he has poor posture and is sitting a lot with his new job.

## 2024-06-20 ENCOUNTER — HOSPITAL ENCOUNTER (OUTPATIENT)
Dept: ULTRASOUND IMAGING | Facility: HOSPITAL | Age: 34
Discharge: HOME | End: 2024-06-20
Payer: COMMERCIAL

## 2024-06-20 ENCOUNTER — APPOINTMENT (OUTPATIENT)
Dept: LAB | Facility: HOSPITAL | Age: 34
End: 2024-06-20
Payer: COMMERCIAL

## 2024-06-20 DIAGNOSIS — Z00.6 ENCOUNTER FOR EXAMINATION FOR NORMAL COMPARISON OR CONTROL IN CLINICAL RESEARCH PROGRAM: ICD-10-CM

## 2024-06-20 DIAGNOSIS — R10.12 LUQ PAIN: ICD-10-CM

## 2024-06-20 PROCEDURE — 36415 COLL VENOUS BLD VENIPUNCTURE: CPT

## 2024-06-20 PROCEDURE — 76705 ECHO EXAM OF ABDOMEN: CPT

## 2024-08-27 ENCOUNTER — TELEPHONE (OUTPATIENT)
Dept: FAMILY MEDICINE CLINIC | Facility: CLINIC | Age: 34
End: 2024-08-27

## 2024-08-30 ENCOUNTER — TELEPHONE (OUTPATIENT)
Dept: FAMILY MEDICINE CLINIC | Facility: CLINIC | Age: 34
End: 2024-08-30

## 2024-08-30 DIAGNOSIS — R10.12 LUQ PAIN: Primary | ICD-10-CM

## 2024-09-10 ENCOUNTER — HOSPITAL ENCOUNTER (OUTPATIENT)
Dept: CT IMAGING | Facility: HOSPITAL | Age: 34
Discharge: HOME | End: 2024-09-10
Payer: COMMERCIAL

## 2024-09-10 DIAGNOSIS — R10.12 LUQ PAIN: ICD-10-CM

## 2024-09-10 PROCEDURE — 74150 CT ABDOMEN W/O CONTRAST: CPT

## 2024-09-11 DIAGNOSIS — R10.12 LUQ PAIN: Primary | ICD-10-CM

## 2024-11-01 ENCOUNTER — OFFICE VISIT (OUTPATIENT)
Dept: FAMILY MEDICINE CLINIC | Facility: CLINIC | Age: 34
End: 2024-11-01
Payer: COMMERCIAL

## 2024-11-01 ENCOUNTER — APPOINTMENT (OUTPATIENT)
Dept: LAB | Facility: MEDICAL CENTER | Age: 34
End: 2024-11-01
Payer: COMMERCIAL

## 2024-11-01 VITALS
TEMPERATURE: 97.3 F | BODY MASS INDEX: 29.92 KG/M2 | DIASTOLIC BLOOD PRESSURE: 80 MMHG | WEIGHT: 202 LBS | HEART RATE: 79 BPM | HEIGHT: 69 IN | OXYGEN SATURATION: 97 % | RESPIRATION RATE: 18 BRPM | SYSTOLIC BLOOD PRESSURE: 120 MMHG

## 2024-11-01 DIAGNOSIS — J30.1 ALLERGIC RHINITIS DUE TO POLLEN, UNSPECIFIED SEASONALITY: ICD-10-CM

## 2024-11-01 DIAGNOSIS — F41.9 ANXIETY AND DEPRESSION: Primary | ICD-10-CM

## 2024-11-01 DIAGNOSIS — F32.A ANXIETY AND DEPRESSION: Primary | ICD-10-CM

## 2024-11-01 DIAGNOSIS — Z23 ENCOUNTER FOR IMMUNIZATION: ICD-10-CM

## 2024-11-01 DIAGNOSIS — Z13.89 SCREENING FOR MULTIPLE CONDITIONS: ICD-10-CM

## 2024-11-01 LAB
ALBUMIN SERPL BCG-MCNC: 4.5 G/DL (ref 3.5–5)
ALP SERPL-CCNC: 55 U/L (ref 34–104)
ALT SERPL W P-5'-P-CCNC: 38 U/L (ref 7–52)
ANION GAP SERPL CALCULATED.3IONS-SCNC: 8 MMOL/L (ref 4–13)
AST SERPL W P-5'-P-CCNC: 28 U/L (ref 13–39)
BILIRUB SERPL-MCNC: 0.51 MG/DL (ref 0.2–1)
BUN SERPL-MCNC: 16 MG/DL (ref 5–25)
CALCIUM SERPL-MCNC: 9.4 MG/DL (ref 8.4–10.2)
CHLORIDE SERPL-SCNC: 106 MMOL/L (ref 96–108)
CHOLEST SERPL-MCNC: 197 MG/DL
CO2 SERPL-SCNC: 28 MMOL/L (ref 21–32)
CREAT SERPL-MCNC: 1.12 MG/DL (ref 0.6–1.3)
ERYTHROCYTE [DISTWIDTH] IN BLOOD BY AUTOMATED COUNT: 12.8 % (ref 11.6–15.1)
GFR SERPL CREATININE-BSD FRML MDRD: 86 ML/MIN/1.73SQ M
GLUCOSE P FAST SERPL-MCNC: 94 MG/DL (ref 65–99)
HCT VFR BLD AUTO: 46.5 % (ref 36.5–49.3)
HDLC SERPL-MCNC: 43 MG/DL
HGB BLD-MCNC: 15.5 G/DL (ref 12–17)
LDLC SERPL CALC-MCNC: 131 MG/DL (ref 0–100)
MCH RBC QN AUTO: 30.8 PG (ref 26.8–34.3)
MCHC RBC AUTO-ENTMCNC: 33.3 G/DL (ref 31.4–37.4)
MCV RBC AUTO: 92 FL (ref 82–98)
PLATELET # BLD AUTO: 265 THOUSANDS/UL (ref 149–390)
PMV BLD AUTO: 10 FL (ref 8.9–12.7)
POTASSIUM SERPL-SCNC: 4 MMOL/L (ref 3.5–5.3)
PROT SERPL-MCNC: 7.3 G/DL (ref 6.4–8.4)
RBC # BLD AUTO: 5.03 MILLION/UL (ref 3.88–5.62)
SODIUM SERPL-SCNC: 142 MMOL/L (ref 135–147)
TRIGL SERPL-MCNC: 117 MG/DL
TSH SERPL DL<=0.05 MIU/L-ACNC: 2.88 UIU/ML (ref 0.45–4.5)
WBC # BLD AUTO: 5.38 THOUSAND/UL (ref 4.31–10.16)

## 2024-11-01 PROCEDURE — 85027 COMPLETE CBC AUTOMATED: CPT

## 2024-11-01 PROCEDURE — 90471 IMMUNIZATION ADMIN: CPT | Performed by: PHYSICIAN ASSISTANT

## 2024-11-01 PROCEDURE — 80053 COMPREHEN METABOLIC PANEL: CPT

## 2024-11-01 PROCEDURE — 90656 IIV3 VACC NO PRSV 0.5 ML IM: CPT | Performed by: PHYSICIAN ASSISTANT

## 2024-11-01 PROCEDURE — 80061 LIPID PANEL: CPT

## 2024-11-01 PROCEDURE — 84443 ASSAY THYROID STIM HORMONE: CPT

## 2024-11-01 PROCEDURE — 99213 OFFICE O/P EST LOW 20 MIN: CPT | Performed by: PHYSICIAN ASSISTANT

## 2024-11-01 PROCEDURE — 36415 COLL VENOUS BLD VENIPUNCTURE: CPT

## 2024-11-01 RX ORDER — FLUTICASONE PROPIONATE 50 MCG
2 SPRAY, SUSPENSION (ML) NASAL DAILY
Qty: 48 ML | Refills: 4 | Status: SHIPPED | OUTPATIENT
Start: 2024-11-01

## 2024-11-01 NOTE — ASSESSMENT & PLAN NOTE
Stable, continue same. Refill given.    Orders:    sertraline (ZOLOFT) 50 mg tablet; Take 1 tablet (50 mg total) by mouth daily

## 2024-11-01 NOTE — PROGRESS NOTES
Ambulatory Visit  Name: Toni Garcia      : 1990      MRN: [id number]  Encounter Provider: Lucy Schumacher PA-C  Encounter Date: 2024   Encounter department: [facility]    Assessment & Plan  Anxiety and depression  Stable, continue same. Refill given.    Orders:    sertraline (ZOLOFT) 50 mg tablet; Take 1 tablet (50 mg total) by mouth daily    Allergic rhinitis due to pollen, unspecified seasonality    Stable, continue same. Refill given.    Orders:    fluticasone (FLONASE) 50 mcg/act nasal spray; 2 sprays into each nostril daily    Encounter for immunization    Orders:    influenza vaccine preservative-free 0.5 mL IM (Fluzone, Afluria, Fluarix, Flulaval)    Screening for multiple conditions    Orders:    CBC; Future    Comprehensive metabolic panel; Future    Lipid Panel with Direct LDL reflex; Future    TSH, 3rd generation with Free T4 reflex; Future      Depression Screening and Follow-up Plan: Patient was screened for depression during today's encounter. They screened negative with a PHQ-9 score of 0.      History of Present Illness     Glenn is here today for routine follow up. Doing very well, no complaints. Requesting refills as well as routine blood work.          Review of Systems   Constitutional:  Negative for chills and fever.   HENT:  Negative for ear pain and sore throat.    Eyes:  Negative for pain and visual disturbance.   Respiratory:  Negative for cough and shortness of breath.    Cardiovascular:  Negative for chest pain and palpitations.   Gastrointestinal:  Negative for abdominal pain and vomiting.   Genitourinary:  Negative for dysuria and hematuria.   Musculoskeletal:  Negative for arthralgias and back pain.   Skin:  Negative for color change and rash.   Neurological:  Negative for seizures and syncope.   All other systems reviewed and are negative.          Objective     /80 (BP Location: Left arm, Patient Position: Sitting, Cuff Size: Standard)   Pulse  "79   Temp (!) 97.3 °F (36.3 °C) (Temporal)   Resp 18   Ht 5' 9\" (1.753 m)   Wt 91.6 kg (202 lb)   SpO2 97%   BMI 29.83 kg/m²     Physical Exam  Vitals reviewed.   Constitutional:       General: He is not in acute distress.     Appearance: He is well-developed. He is not diaphoretic.   HENT:      Head: Normocephalic and atraumatic.      Right Ear: Hearing, tympanic membrane, ear canal and external ear normal.      Left Ear: Hearing, tympanic membrane, ear canal and external ear normal.      Nose: Nose normal.      Mouth/Throat:      Mouth: Mucous membranes are moist.      Pharynx: Oropharynx is clear. Uvula midline. No oropharyngeal exudate.   Eyes:      General: No scleral icterus.        Right eye: No discharge.         Left eye: No discharge.      Conjunctiva/sclera: Conjunctivae normal.   Neck:      Thyroid: No thyromegaly.      Vascular: No carotid bruit.   Cardiovascular:      Rate and Rhythm: Normal rate and regular rhythm.      Heart sounds: Normal heart sounds. No murmur heard.  Pulmonary:      Effort: Pulmonary effort is normal. No respiratory distress.      Breath sounds: Normal breath sounds. No wheezing.   Abdominal:      General: Bowel sounds are normal. There is no distension.      Palpations: Abdomen is soft. There is no mass.      Tenderness: There is no abdominal tenderness. There is no guarding or rebound.   Musculoskeletal:         General: No tenderness. Normal range of motion.      Cervical back: Neck supple.   Lymphadenopathy:      Cervical: No cervical adenopathy.   Skin:     General: Skin is warm and dry.      Findings: No erythema or rash.   Neurological:      Mental Status: He is alert and oriented to person, place, and time.   Psychiatric:         Behavior: Behavior normal.         Thought Content: Thought content normal.         Judgment: Judgment normal.         "

## 2024-11-01 NOTE — ASSESSMENT & PLAN NOTE
Stable, continue same. Refill given.    Orders:    fluticasone (FLONASE) 50 mcg/act nasal spray; 2 sprays into each nostril daily

## 2024-11-01 NOTE — ASSESSMENT & PLAN NOTE
Orders:    CBC; Future    Comprehensive metabolic panel; Future    Lipid Panel with Direct LDL reflex; Future    TSH, 3rd generation with Free T4 reflex; Future

## 2024-11-20 LAB
APOB+LDLR+PCSK9 GENE MUT ANL BLD/T: NOT DETECTED
BRCA1+BRCA2 DEL+DUP + FULL MUT ANL BLD/T: ABNORMAL
GENE DIS ANL INTERP-IMP: POSITIVE
MLH1+MSH2+MSH6+PMS2 GN DEL+DUP+FUL M: NOT DETECTED

## 2024-11-22 ENCOUNTER — RESULTS FOLLOW-UP (OUTPATIENT)
Dept: LAB | Facility: HOSPITAL | Age: 34
End: 2024-11-22

## 2024-11-22 NOTE — TELEPHONE ENCOUNTER
I have nothing on file that would qualify for a handicap placard. Therefore, she should make an appointment to discuss this.   The state requires specific criteria to qualify for a handicap placard Left a voicemail about [employer] on 11/22/2024.

## 2024-11-25 ENCOUNTER — APPOINTMENT (OUTPATIENT)
Dept: URGENT CARE | Facility: CLINIC | Age: 34
End: 2024-11-25

## 2024-11-26 ENCOUNTER — TELEPHONE (OUTPATIENT)
Dept: OTHER | Facility: HOSPITAL | Age: 34
End: 2024-11-26

## 2024-11-26 DIAGNOSIS — R89.8 ABNORMAL GENETIC TEST: Primary | ICD-10-CM

## 2024-11-26 NOTE — TELEPHONE ENCOUNTER
Glenn participated in the [employer] study and has an actionable result related to HBOC. He would like a referral to a genetic counselor.

## 2024-11-27 ENCOUNTER — TELEPHONE (OUTPATIENT)
Dept: GENETICS | Facility: CLINIC | Age: 34
End: 2024-11-27

## 2024-11-27 NOTE — TELEPHONE ENCOUNTER
Left a message for Glenn to schedule him for an appointment with a genetic counselor to discuss his helix results

## 2024-12-04 ENCOUNTER — TELEPHONE (OUTPATIENT)
Age: 34
End: 2024-12-04

## 2024-12-04 NOTE — TELEPHONE ENCOUNTER
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 He say's he called and was told they are on the providers desk. He would like to know when they will be ready as this is time sensitive for him. Please advise. Patient can be reached at [phone]

## 2024-12-04 NOTE — TELEPHONE ENCOUNTER
Spoke with pt and made aware Lucy was out of the office all last wk and that the papers were filled out last night and on my desk this morning.   I did fax them   Pt verbalized understanding

## 2024-12-10 ENCOUNTER — TELEPHONE (OUTPATIENT)
Dept: FAMILY MEDICINE CLINIC | Facility: CLINIC | Age: 34
End: 2024-12-10

## 2024-12-10 PROBLEM — Z15.09 MONOALLELIC MUTATION OF BRCA1 GENE: Status: ACTIVE | Noted: 2024-12-10

## 2024-12-10 PROBLEM — Z15.01 MONOALLELIC MUTATION OF BRCA1 GENE: Status: ACTIVE | Noted: 2024-12-10

## 2024-12-10 NOTE — TELEPHONE ENCOUNTER
Called Pt asking him to check his MyChart for results message - He was at work today & missed the call

## 2024-12-11 ENCOUNTER — TELEPHONE (OUTPATIENT)
Dept: GENETICS | Facility: CLINIC | Age: 34
End: 2024-12-11

## 2024-12-11 NOTE — TELEPHONE ENCOUNTER
Patient called in to the office to schedule a helix result review via telephone on 10/30/2024 9:30 AM with Felisa.

## 2024-12-30 ENCOUNTER — OFFICE VISIT (OUTPATIENT)
Dept: GENETICS | Facility: CLINIC | Age: 34
End: 2024-12-30
Payer: COMMERCIAL

## 2024-12-30 DIAGNOSIS — Z15.01 MONOALLELIC MUTATION OF BRCA1 GENE: Primary | ICD-10-CM

## 2024-12-30 DIAGNOSIS — Z15.09 MONOALLELIC MUTATION OF BRCA1 GENE: Primary | ICD-10-CM

## 2024-12-30 DIAGNOSIS — J30.1 ALLERGIC RHINITIS DUE TO POLLEN, UNSPECIFIED SEASONALITY: ICD-10-CM

## 2024-12-30 PROCEDURE — 96040 PR MEDICAL GENETICS COUNSELING EACH 30 MINUTES: CPT | Performed by: GENETIC COUNSELOR, MS

## 2024-12-30 NOTE — PROGRESS NOTES
Post-Test Genetic Counseling Consult Note    Patient Name: Toni Garcia   /Age: 1990/34 y.o.    Date of Service: 2024  Genetic Counselor: Felisa Spencer MS, [facility] [place]pretation Services: None  Location: Telephone consult   Length of Visit: 60 Minutes    Toni presents today for a consult regarding his [facility] test results.    Genetic Testing History:     Report Date: 2024     Test: [facility] Molecular Screen (11 genes): BRCA1, BRCA2, MLH1, MSH2, MSH6, PMS2, EPCAM, APOB, LDLR, LDLRAP1, and PCSK9       Result: Positive: BRCA1 Likely Pathogenic      Variant 1: BRCA1 c.74C>T([id number]); heterozygous, likely pathogenic     Screening History  No current prostate cancer screening  No prior colonoscopy  No current skin cancer screening     Relevant Family History     Patient reports no Ashkenazi Orthodox ancestry.     No family members have undergone genetic testing     Maternal Family History:  Mother (age 56) with no history of cancer   Maternal Grandfather (d. Age 50-60's) with lung cancer; he had a history of smoking and other environmental exposures     Paternal Family History:  Father ( age 73) with no history of cancer  Paternal Aunt (age 67) with a brain tumor requiring surgery and radiation     Please refer to the scanned pedigree in the Media Tab for a complete family history     *All history is reported as provided by the patient; records are not available for review, except where indicated.     [facility] Result:    Toni underwent genetic testing through the [facility] health research program. As a part of this program, 11 genes associated with hereditary breast and ovarian cancer (HBOC) syndrome, Berger syndrome and familial hypercholesterolemia (FH) were tested. Toni's result returned positive for the BRCA1 gene. During today's visit, Toni and I reviewed the cancer risks associated with his result, and the recommendations for  screening and management.    Assessment:   Toni carries one likely pathogenic variant in the BRCA1 gene, specifically BRCA1 c.74C>T(p.[id number]). The BRCA1 gene is associated with autosomal dominant hereditary breast and ovarian cancer (HBOC) syndrome ([facility] UID: [id number]).     Hereditary breast and ovarian cancer (HBOC) syndrome     Women with a single BRCA1 pathogenic variant have approximately a 60-72% lifetime risk of breast cancer. The risk for a second primary breast cancer within 20 years of the first diagnosis is between 30-40%. In premenopausal women, the risk for a second breast cancer within 15 years of the first diagnosis is >20%.    Women also have up to a 39-58% lifetime risk for ovarian, fallopian tube, or peritoneal cancer to age 70.      Men with a BRCA1 pathogenic variant have up to a .2-1.2% risk for breast cancer and up to a 7-26% risk for prostate cancer.      Men and women also have an elevated risk for melanoma and up to a <=5% risk for pancreatic cancer.       Risks and Testing for Family Members:  This test result may help clarify the risk for other family members to develop cancer. All first-degree relatives (parents, siblings and children) have up to a 50% chance of having the pathogenic variant.  Other relatives such as aunts, uncles and cousins may also be at risk.  Since it is not known with one-hundred percent certainty which side of the family this BRCA1 likely pathogenic variant came from, we recommend that Toni share this test results with extended family members from both sides of his family.       We encouraged Toni  to discuss this information with his relatives.  If Toni family members have any questions or are interested in testing they can reach out to the main Genetics number at [phone] for additional information.     Reproductive Risks:  Individuals with a single BRCA1 pathogenic variant may also be at risk to have a child with Fanconi Anemia  if their partner also carries a BRCA1 variant.  Fanconi anemia is characterized by bone marrow failure, short stature, abnormal skin pigmentation, developmental delay and malformations of the thumbs, skeletal and central nervous systems.  Individuals with Fanconi anemia also have an increased risk for leukemia and early onset solid tumors.  In some cases, it may be appropriate for a partner to undergo genetic testing to better understand the risk of having a child with this condition.  For patients of reproductive age, there are options for prenatal diagnosis and assisted reproduction including pre-implantation genetic diagnosis. Glenn and [facility] reviewed that his partner can consider testing for the BRCA1 gene for reproductive implications. Should his partner also be positive for a mutation in this gene, we can provide a referral to maternal fetal medicine for reproductive genetic counseling.    Additional Information:  A healthy lifestyle will improve overall health and reduce risk for illness. Eating a healthy diet and exercising for 4 hours per week is recommended. Both diet and exercise have been shown to help maintain a healthy weight. Postmenopausal women who are overweight are at higher risk for breast cancer. Moderate to heavy alcohol use can increase the risk for some cancers. Smoking cigarettes can also increase risk for breast, lung, prostate, pancreatic and other cancers.      Management:  Management guidelines for individuals with pathogenic and likely pathogenic BRCA1 variants have been developed by the National Comprehensive Cancer Network.  Please refer to the current NCCN guidelines for the most up to date guidelines (https://www.nccn.org/guidelines/category_2).  The recommendations listed below are specific to Toni and are are based on recommendations in the the NCCN guidelines as of 12/30/24.  These recommendations are subject to change over time and the newest guidelines should be referenced  for the most up to date recommendations.       Plan:    MEN    Breast Cancer Screening  -Breast self-exam training and education starting at age 35 y  -Clinical breast exam, every 12 mo, starting at age 35 y  -Consider annual mammogram screening in men starting at age 50 or 10 years before the earliest known male breast cancer in the family (whichever comes first)    Prostate Cancer Screening  -Starting at age 40 y: (See Guidelines for Prostate Cancer Early Detection).      Skin Cancer Screening  -No specific screening guidelines exist for melanoma, but general melanoma risk management is appropriate, such as annual full-body skin examination and minimizing UV exposure.    Pancreatic Cancer Screening:  Current NCCN Guidelines recommend pancreatic cancer screening for individuals with an BRCA1 pathogenic variant and a first- or second-degree relative with exocrine pancreatic cancer from the same side of the family as the identified pathogenic/likely pathogenic germline variant. Pancreatic cancer screening typically begins at age 50 (or 10 years younger than the earliest exocrine pancreatic cancer diagnosis in the family, whichever is earlier) and includes contrast-enhanced MRI/MRCP (magnetic resonance cholangiopancreatography) and/or endoscopic ultrasound (EU).     Currently there is no known family history of pancreatic cancer therefore we do not recommend pancreatic screening for Toni at this time. We did encourage Toni to keep his healthcare providers updated on any changes to his personal or family history as updated information may change this recommendation.    Additional Testing:  The testing performed in this study assessed for 7 cancer risk-related genes. Additional cancer risk genes are available for diagnostic testing. Based on Toni's reported family history  he does not meet additional testing criteria.  Glenn can consider additional genetic testing in the future if there are changes  to his personal and/or family history.      Positive Result: Toni was strongly encouraged to follow up with our office on an annual basis to review the most up to date guidelines as recommendations are subject to change over time.

## 2025-01-02 RX ORDER — FLUTICASONE PROPIONATE 50 MCG
2 SPRAY, SUSPENSION (ML) NASAL DAILY
Qty: 48 ML | Refills: 4 | Status: SHIPPED | OUTPATIENT
Start: 2025-01-02

## 2025-03-06 ENCOUNTER — APPOINTMENT (OUTPATIENT)
Dept: URBAN - NONMETROPOLITAN AREA CLINIC 4 | Facility: CLINIC | Age: 35
Setting detail: DERMATOLOGY
End: 2025-03-06

## 2025-03-06 DIAGNOSIS — A63.0 ANOGENITAL (VENEREAL) WARTS: ICD-10-CM

## 2025-03-06 PROCEDURE — 17110 DESTRUCTION B9 LES UP TO 14: CPT

## 2025-03-06 PROCEDURE — ? LIQUID NITROGEN

## 2025-03-06 PROCEDURE — ? COUNSELING

## 2025-03-06 PROCEDURE — ? PRESCRIPTION

## 2025-03-06 PROCEDURE — ? PRESCRIPTION MEDICATION MANAGEMENT

## 2025-03-06 PROCEDURE — ? PHOTO-DOCUMENTATION

## 2025-03-06 RX ORDER — IMIQUIMOD 12.5 MG/.25G
5% CREAM TOPICAL
Qty: 12 | Refills: 0 | Status: ERX | COMMUNITY
Start: 2025-03-06

## 2025-03-06 RX ADMIN — IMIQUIMOD 5%: 12.5 CREAM TOPICAL at 00:00

## 2025-03-06 ASSESSMENT — LOCATION DETAILED DESCRIPTION DERM
LOCATION DETAILED: RIGHT DORSAL SHAFT OF PENIS
LOCATION DETAILED: LEFT DORSAL SHAFT OF PENIS

## 2025-03-06 ASSESSMENT — LOCATION ZONE DERM: LOCATION ZONE: PENIS

## 2025-03-06 ASSESSMENT — TOTAL NUMBER OF CONDYLOMA: # OF LESIONS?: 2

## 2025-03-06 ASSESSMENT — LOCATION SIMPLE DESCRIPTION DERM: LOCATION SIMPLE: PENIS

## 2025-03-06 NOTE — HPI: SKIN LESION
What Type Of Note Output Would You Prefer (Optional)?: Standard Output
How Severe Is Your Skin Lesion?: mild
Has Your Skin Lesion Been Treated?: not been treated
Is This A New Presentation, Or A Follow-Up?: Skin Lesion
Which Family Member (Optional)?: Grandmother / grandfather
Additional History: Patient states he was prescribed imiquimod cream he was apply to lesion .

## 2025-03-06 NOTE — PROCEDURE: PRESCRIPTION MEDICATION MANAGEMENT
Render In Strict Bullet Format?: No
Plan: Follow-up in 6 weeks
Initiate Treatment: imiquimod 5 % topical cream packet AD\\nQuantity: 12.0 Packet  Days Supply: 30\\nSig: Apply thin film to AA on lesion Monday , Wednesday, Friday starting march 14th 
Detail Level: Zone

## 2025-03-06 NOTE — PROCEDURE: LIQUID NITROGEN
Duration Of Freeze Thaw-Cycle (Seconds): 3
Spray Paint Text: The liquid nitrogen was applied to the skin utilizing a spray paint frosting technique.
Post-Care Instructions: I reviewed with the patient in detail post-care instructions. Patient is to wear sunprotection, and avoid picking at any of the treated lesions. Pt may apply Vaseline to crusted or scabbing areas.
Medical Necessity Information: It is in your best interest to select a reason for this procedure from the list below. All of these items fulfill various CMS LCD requirements except the new and changing color options.
Show Spray Paint Technique Variable?: Yes
Include Z78.9 (Other Specified Conditions Influencing Health Status) As An Associated Diagnosis?: No
Number Of Freeze-Thaw Cycles: 3 freeze-thaw cycles
Medical Necessity Clause: This procedure was medically necessary because the lesions that were treated were:
Consent: The patient's consent was obtained including but not limited to risks of crusting, scabbing, blistering, scarring, darker or lighter pigmentary change, recurrence, incomplete removal and infection.
Application Tool (Optional): Cry-AC
Detail Level: Detailed

## 2025-04-16 ENCOUNTER — OFFICE VISIT (OUTPATIENT)
Dept: FAMILY MEDICINE CLINIC | Facility: CLINIC | Age: 35
End: 2025-04-16
Payer: COMMERCIAL

## 2025-04-16 VITALS
SYSTOLIC BLOOD PRESSURE: 130 MMHG | BODY MASS INDEX: 30.01 KG/M2 | WEIGHT: 202.6 LBS | HEART RATE: 83 BPM | HEIGHT: 69 IN | DIASTOLIC BLOOD PRESSURE: 80 MMHG | OXYGEN SATURATION: 97 % | TEMPERATURE: 97.3 F

## 2025-04-16 DIAGNOSIS — F41.9 ANXIETY AND DEPRESSION: Primary | ICD-10-CM

## 2025-04-16 DIAGNOSIS — F32.A ANXIETY AND DEPRESSION: Primary | ICD-10-CM

## 2025-04-16 PROCEDURE — 99213 OFFICE O/P EST LOW 20 MIN: CPT | Performed by: PHYSICIAN ASSISTANT

## 2025-04-16 RX ORDER — BUPROPION HYDROCHLORIDE 150 MG/1
150 TABLET ORAL DAILY
Qty: 30 TABLET | Refills: 0 | Status: SHIPPED | OUTPATIENT
Start: 2025-04-16

## 2025-04-16 NOTE — ASSESSMENT & PLAN NOTE
Will taper patient off of sertraline.  He can take 50 mg every other day for a week then stop.  Then will start bupropion 150 mg daily.  Patient to follow-up with me in 5 weeks.  If he has any issues sooner, he can certainly come in or call.    Orders:  •  buPROPion (Wellbutrin XL) 150 mg 24 hr tablet; Take 1 tablet (150 mg total) by mouth daily

## 2025-04-16 NOTE — PROGRESS NOTES
Name: Toni Garcia      : 1990      MRN: [id number]  Encounter Provider: Lucy Schumacher PA-C  Encounter Date: 2025   Encounter department: [facility]  :  Assessment & Plan  Anxiety and depression  Will taper patient off of sertraline.  He can take 50 mg every other day for a week then stop.  Then will start bupropion 150 mg daily.  Patient to follow-up with me in 5 weeks.  If he has any issues sooner, he can certainly come in or call.    Orders:  •  buPROPion (Wellbutrin XL) 150 mg 24 hr tablet; Take 1 tablet (150 mg total) by mouth daily          Depression Screening and Follow-up Plan: Patient was screened for depression during today's encounter. They screened negative with a PHQ-9 score of 2.        History of Present Illness   Glenn is here today to discuss depression and anxiety.  He notes that he is in a new relationship and has been experiencing some sexual side effects from the sertraline.  He is interested in trying another medication for his depression.  He states that he is read that if he adds Wellbutrin to the sertraline, it will counteract the side effects.  I did explain that Wellbutrin does not counteract side effects, but sometimes it does have less side effects than sertraline, and he would like to make the change to Wellbutrin at this time.      Review of Systems   Constitutional:  Negative for chills and fever.   HENT:  Negative for ear pain and sore throat.    Eyes:  Negative for pain and visual disturbance.   Respiratory:  Negative for cough and shortness of breath.    Cardiovascular:  Negative for chest pain and palpitations.   Gastrointestinal:  Negative for abdominal pain and vomiting.   Genitourinary:  Negative for dysuria and hematuria.   Musculoskeletal:  Negative for arthralgias and back pain.   Skin:  Negative for color change and rash.   Neurological:  Negative for seizures and syncope.   All other systems reviewed and are negative.      Objective   BP  "130/80 (BP Location: Right arm, Patient Position: Sitting, Cuff Size: Standard)   Pulse 83   Temp (!) 97.3 °F (36.3 °C) (Temporal)   Ht 5' 9\" (1.753 m)   Wt 91.9 kg (202 lb 9.6 oz)   SpO2 97%   BMI 29.92 kg/m²      Physical Exam  Vitals reviewed.   Constitutional:       General: He is not in acute distress.     Appearance: He is well-developed. He is not diaphoretic.   HENT:      Head: Normocephalic and atraumatic.      Right Ear: Hearing, tympanic membrane, ear canal and external ear normal.      Left Ear: Hearing, tympanic membrane, ear canal and external ear normal.      Nose: Nose normal.      Mouth/Throat:      Mouth: Mucous membranes are moist.      Pharynx: Oropharynx is clear. Uvula midline. No oropharyngeal exudate.   Eyes:      General: No scleral icterus.        Right eye: No discharge.         Left eye: No discharge.      Conjunctiva/sclera: Conjunctivae normal.   Neck:      Thyroid: No thyromegaly.      Vascular: No carotid bruit.   Cardiovascular:      Rate and Rhythm: Normal rate and regular rhythm.      Heart sounds: Normal heart sounds. No murmur heard.  Pulmonary:      Effort: Pulmonary effort is normal. No respiratory distress.      Breath sounds: Normal breath sounds. No wheezing.   Abdominal:      General: Bowel sounds are normal. There is no distension.      Palpations: Abdomen is soft. There is no mass.      Tenderness: There is no abdominal tenderness. There is no guarding or rebound.   Musculoskeletal:         General: No tenderness. Normal range of motion.      Cervical back: Neck supple.   Lymphadenopathy:      Cervical: No cervical adenopathy.   Skin:     General: Skin is warm and dry.      Findings: No erythema or rash.   Neurological:      Mental Status: He is alert and oriented to person, place, and time.   Psychiatric:         Behavior: Behavior normal.         Thought Content: Thought content normal.         Judgment: Judgment normal.         "

## 2025-05-08 DIAGNOSIS — F41.9 ANXIETY AND DEPRESSION: ICD-10-CM

## 2025-05-08 DIAGNOSIS — F32.A ANXIETY AND DEPRESSION: ICD-10-CM

## 2025-05-08 RX ORDER — BUPROPION HYDROCHLORIDE 150 MG/1
150 TABLET ORAL DAILY
Qty: 90 TABLET | Refills: 1 | Status: SHIPPED | OUTPATIENT
Start: 2025-05-08

## 2025-05-26 DIAGNOSIS — F32.A ANXIETY AND DEPRESSION: ICD-10-CM

## 2025-05-26 DIAGNOSIS — F41.9 ANXIETY AND DEPRESSION: ICD-10-CM
